# Patient Record
Sex: MALE | Race: WHITE | Employment: OTHER | URBAN - METROPOLITAN AREA
[De-identification: names, ages, dates, MRNs, and addresses within clinical notes are randomized per-mention and may not be internally consistent; named-entity substitution may affect disease eponyms.]

---

## 2017-02-03 ENCOUNTER — OFFICE VISIT (OUTPATIENT)
Dept: INTERNAL MEDICINE | Age: 72
End: 2017-02-03

## 2017-02-03 VITALS
RESPIRATION RATE: 16 BRPM | WEIGHT: 225 LBS | SYSTOLIC BLOOD PRESSURE: 124 MMHG | HEART RATE: 64 BPM | BODY MASS INDEX: 34.1 KG/M2 | TEMPERATURE: 98.2 F | HEIGHT: 68 IN | DIASTOLIC BLOOD PRESSURE: 66 MMHG | OXYGEN SATURATION: 96 %

## 2017-02-03 DIAGNOSIS — G25.0 ESSENTIAL TREMOR: ICD-10-CM

## 2017-02-03 DIAGNOSIS — F17.209 TOBACCO USE DISORDER, CONTINUOUS: ICD-10-CM

## 2017-02-03 DIAGNOSIS — I10 ESSENTIAL HYPERTENSION, BENIGN: Primary | ICD-10-CM

## 2017-02-03 DIAGNOSIS — I25.10 CORONARY ARTERY DISEASE INVOLVING NATIVE CORONARY ARTERY OF NATIVE HEART WITHOUT ANGINA PECTORIS: ICD-10-CM

## 2017-02-03 DIAGNOSIS — E78.5 DYSLIPIDEMIA: ICD-10-CM

## 2017-02-03 DIAGNOSIS — N18.2 CKD (CHRONIC KIDNEY DISEASE) STAGE 2, GFR 60-89 ML/MIN: ICD-10-CM

## 2017-02-03 DIAGNOSIS — Z23 NEED FOR PNEUMOCOCCAL VACCINATION: ICD-10-CM

## 2017-02-03 DIAGNOSIS — J44.9 CHRONIC OBSTRUCTIVE PULMONARY DISEASE, UNSPECIFIED COPD TYPE (HCC): ICD-10-CM

## 2017-02-03 PROCEDURE — G8926 SPIRO NO PERF OR DOC: HCPCS | Performed by: INTERNAL MEDICINE

## 2017-02-03 PROCEDURE — G8419 CALC BMI OUT NRM PARAM NOF/U: HCPCS | Performed by: INTERNAL MEDICINE

## 2017-02-03 PROCEDURE — 3017F COLORECTAL CA SCREEN DOC REV: CPT | Performed by: INTERNAL MEDICINE

## 2017-02-03 PROCEDURE — G8598 ASA/ANTIPLAT THER USED: HCPCS | Performed by: INTERNAL MEDICINE

## 2017-02-03 PROCEDURE — 4004F PT TOBACCO SCREEN RCVD TLK: CPT | Performed by: INTERNAL MEDICINE

## 2017-02-03 PROCEDURE — 1123F ACP DISCUSS/DSCN MKR DOCD: CPT | Performed by: INTERNAL MEDICINE

## 2017-02-03 PROCEDURE — G8427 DOCREV CUR MEDS BY ELIG CLIN: HCPCS | Performed by: INTERNAL MEDICINE

## 2017-02-03 PROCEDURE — G0009 ADMIN PNEUMOCOCCAL VACCINE: HCPCS | Performed by: INTERNAL MEDICINE

## 2017-02-03 PROCEDURE — G8484 FLU IMMUNIZE NO ADMIN: HCPCS | Performed by: INTERNAL MEDICINE

## 2017-02-03 PROCEDURE — 3023F SPIROM DOC REV: CPT | Performed by: INTERNAL MEDICINE

## 2017-02-03 PROCEDURE — 99214 OFFICE O/P EST MOD 30 MIN: CPT | Performed by: INTERNAL MEDICINE

## 2017-02-03 PROCEDURE — 4040F PNEUMOC VAC/ADMIN/RCVD: CPT | Performed by: INTERNAL MEDICINE

## 2017-02-03 PROCEDURE — 90732 PPSV23 VACC 2 YRS+ SUBQ/IM: CPT | Performed by: INTERNAL MEDICINE

## 2017-02-03 ASSESSMENT — ENCOUNTER SYMPTOMS
CONSTIPATION: 0
SHORTNESS OF BREATH: 1
BLOOD IN STOOL: 0
ABDOMINAL PAIN: 0
COUGH: 1
WHEEZING: 0
DIARRHEA: 0

## 2017-02-03 ASSESSMENT — PATIENT HEALTH QUESTIONNAIRE - PHQ9
SUM OF ALL RESPONSES TO PHQ9 QUESTIONS 1 & 2: 0
1. LITTLE INTEREST OR PLEASURE IN DOING THINGS: 0
2. FEELING DOWN, DEPRESSED OR HOPELESS: 0
SUM OF ALL RESPONSES TO PHQ QUESTIONS 1-9: 0

## 2017-02-13 DIAGNOSIS — E11.69 TYPE 2 DIABETES MELLITUS WITH OTHER SPECIFIED COMPLICATION (HCC): ICD-10-CM

## 2017-02-13 LAB
ANION GAP SERPL CALCULATED.3IONS-SCNC: 14 MEQ/L (ref 7–13)
BUN BLDV-MCNC: 17 MG/DL (ref 8–23)
CALCIUM SERPL-MCNC: 9.5 MG/DL (ref 8.6–10.2)
CHLORIDE BLD-SCNC: 94 MEQ/L (ref 98–107)
CO2: 30 MEQ/L (ref 22–29)
CREAT SERPL-MCNC: 0.99 MG/DL (ref 0.7–1.2)
GFR AFRICAN AMERICAN: >60
GFR NON-AFRICAN AMERICAN: >60
GLUCOSE BLD-MCNC: 131 MG/DL (ref 74–109)
HBA1C MFR BLD: 9.7 % (ref 4.8–5.9)
POTASSIUM SERPL-SCNC: 3.7 MEQ/L (ref 3.5–5.1)
SODIUM BLD-SCNC: 138 MEQ/L (ref 132–144)

## 2017-02-23 ENCOUNTER — OFFICE VISIT (OUTPATIENT)
Dept: SURGERY | Age: 72
End: 2017-02-23

## 2017-02-23 VITALS
SYSTOLIC BLOOD PRESSURE: 136 MMHG | HEIGHT: 68 IN | WEIGHT: 223 LBS | BODY MASS INDEX: 33.8 KG/M2 | HEART RATE: 70 BPM | DIASTOLIC BLOOD PRESSURE: 84 MMHG

## 2017-02-23 DIAGNOSIS — E11.69 TYPE 2 DIABETES MELLITUS WITH OTHER SPECIFIED COMPLICATION (HCC): Primary | ICD-10-CM

## 2017-02-23 DIAGNOSIS — E11.21 DIABETIC NEPHROPATHY ASSOCIATED WITH TYPE 2 DIABETES MELLITUS (HCC): ICD-10-CM

## 2017-02-23 LAB — GLUCOSE BLD-MCNC: 181 MG/DL

## 2017-02-23 PROCEDURE — 82962 GLUCOSE BLOOD TEST: CPT | Performed by: INTERNAL MEDICINE

## 2017-02-23 PROCEDURE — 3046F HEMOGLOBIN A1C LEVEL >9.0%: CPT | Performed by: INTERNAL MEDICINE

## 2017-02-23 PROCEDURE — 1123F ACP DISCUSS/DSCN MKR DOCD: CPT | Performed by: INTERNAL MEDICINE

## 2017-02-23 PROCEDURE — 4004F PT TOBACCO SCREEN RCVD TLK: CPT | Performed by: INTERNAL MEDICINE

## 2017-02-23 PROCEDURE — G8598 ASA/ANTIPLAT THER USED: HCPCS | Performed by: INTERNAL MEDICINE

## 2017-02-23 PROCEDURE — G8417 CALC BMI ABV UP PARAM F/U: HCPCS | Performed by: INTERNAL MEDICINE

## 2017-02-23 PROCEDURE — 4040F PNEUMOC VAC/ADMIN/RCVD: CPT | Performed by: INTERNAL MEDICINE

## 2017-02-23 PROCEDURE — 99213 OFFICE O/P EST LOW 20 MIN: CPT | Performed by: INTERNAL MEDICINE

## 2017-02-23 PROCEDURE — G8484 FLU IMMUNIZE NO ADMIN: HCPCS | Performed by: INTERNAL MEDICINE

## 2017-02-23 PROCEDURE — 3017F COLORECTAL CA SCREEN DOC REV: CPT | Performed by: INTERNAL MEDICINE

## 2017-02-23 PROCEDURE — G8427 DOCREV CUR MEDS BY ELIG CLIN: HCPCS | Performed by: INTERNAL MEDICINE

## 2017-02-23 RX ORDER — LISINOPRIL 5 MG/1
5 TABLET ORAL DAILY
Qty: 30 TABLET | Refills: 3 | Status: SHIPPED | OUTPATIENT
Start: 2017-02-23 | End: 2017-05-25 | Stop reason: SDUPTHER

## 2017-02-23 ASSESSMENT — ENCOUNTER SYMPTOMS: SHORTNESS OF BREATH: 1

## 2017-04-24 RX ORDER — GLIMEPIRIDE 2 MG/1
TABLET ORAL
Qty: 60 TABLET | Refills: 3 | Status: SHIPPED | OUTPATIENT
Start: 2017-04-24 | End: 2017-08-24 | Stop reason: SDUPTHER

## 2017-04-24 RX ORDER — SITAGLIPTIN 50 MG/1
TABLET, FILM COATED ORAL
Qty: 30 TABLET | Refills: 3 | Status: SHIPPED | OUTPATIENT
Start: 2017-04-24 | End: 2017-11-27 | Stop reason: SDUPTHER

## 2017-05-19 DIAGNOSIS — E11.69 TYPE 2 DIABETES MELLITUS WITH OTHER SPECIFIED COMPLICATION (HCC): ICD-10-CM

## 2017-05-19 LAB
ANION GAP SERPL CALCULATED.3IONS-SCNC: 14 MEQ/L (ref 7–13)
BUN BLDV-MCNC: 26 MG/DL (ref 8–23)
CALCIUM SERPL-MCNC: 9.3 MG/DL (ref 8.6–10.2)
CHLORIDE BLD-SCNC: 96 MEQ/L (ref 98–107)
CO2: 30 MEQ/L (ref 22–29)
CREAT SERPL-MCNC: 0.93 MG/DL (ref 0.7–1.2)
GFR AFRICAN AMERICAN: >60
GFR NON-AFRICAN AMERICAN: >60
GLUCOSE BLD-MCNC: 170 MG/DL (ref 74–109)
HBA1C MFR BLD: 7.9 % (ref 4.8–5.9)
POTASSIUM SERPL-SCNC: 4.3 MEQ/L (ref 3.5–5.1)
SODIUM BLD-SCNC: 140 MEQ/L (ref 132–144)

## 2017-05-24 ENCOUNTER — OFFICE VISIT (OUTPATIENT)
Dept: INTERNAL MEDICINE | Age: 72
End: 2017-05-24

## 2017-05-24 VITALS
HEART RATE: 69 BPM | RESPIRATION RATE: 16 BRPM | OXYGEN SATURATION: 96 % | TEMPERATURE: 98.9 F | WEIGHT: 230 LBS | BODY MASS INDEX: 34.86 KG/M2 | HEIGHT: 68 IN | DIASTOLIC BLOOD PRESSURE: 68 MMHG | SYSTOLIC BLOOD PRESSURE: 134 MMHG

## 2017-05-24 DIAGNOSIS — I10 ESSENTIAL HYPERTENSION: Primary | ICD-10-CM

## 2017-05-24 DIAGNOSIS — E78.5 DYSLIPIDEMIA: ICD-10-CM

## 2017-05-24 DIAGNOSIS — J44.9 CHRONIC OBSTRUCTIVE PULMONARY DISEASE, UNSPECIFIED COPD TYPE (HCC): ICD-10-CM

## 2017-05-24 PROCEDURE — 3023F SPIROM DOC REV: CPT | Performed by: INTERNAL MEDICINE

## 2017-05-24 PROCEDURE — 1123F ACP DISCUSS/DSCN MKR DOCD: CPT | Performed by: INTERNAL MEDICINE

## 2017-05-24 PROCEDURE — 3017F COLORECTAL CA SCREEN DOC REV: CPT | Performed by: INTERNAL MEDICINE

## 2017-05-24 PROCEDURE — G8427 DOCREV CUR MEDS BY ELIG CLIN: HCPCS | Performed by: INTERNAL MEDICINE

## 2017-05-24 PROCEDURE — 99213 OFFICE O/P EST LOW 20 MIN: CPT | Performed by: INTERNAL MEDICINE

## 2017-05-24 PROCEDURE — 4040F PNEUMOC VAC/ADMIN/RCVD: CPT | Performed by: INTERNAL MEDICINE

## 2017-05-24 PROCEDURE — G8926 SPIRO NO PERF OR DOC: HCPCS | Performed by: INTERNAL MEDICINE

## 2017-05-24 PROCEDURE — G8598 ASA/ANTIPLAT THER USED: HCPCS | Performed by: INTERNAL MEDICINE

## 2017-05-24 PROCEDURE — 4004F PT TOBACCO SCREEN RCVD TLK: CPT | Performed by: INTERNAL MEDICINE

## 2017-05-24 PROCEDURE — G8417 CALC BMI ABV UP PARAM F/U: HCPCS | Performed by: INTERNAL MEDICINE

## 2017-05-24 ASSESSMENT — ENCOUNTER SYMPTOMS
BACK PAIN: 0
NAUSEA: 0
BLOOD IN STOOL: 0
SHORTNESS OF BREATH: 1
VOMITING: 0
CONSTIPATION: 1
WHEEZING: 0
DIARRHEA: 0
COUGH: 0

## 2017-05-25 ENCOUNTER — OFFICE VISIT (OUTPATIENT)
Dept: SURGERY | Age: 72
End: 2017-05-25

## 2017-05-25 VITALS
BODY MASS INDEX: 34.56 KG/M2 | SYSTOLIC BLOOD PRESSURE: 102 MMHG | WEIGHT: 228 LBS | HEIGHT: 68 IN | DIASTOLIC BLOOD PRESSURE: 65 MMHG | HEART RATE: 58 BPM

## 2017-05-25 LAB — GLUCOSE BLD-MCNC: 115 MG/DL

## 2017-05-25 PROCEDURE — 99213 OFFICE O/P EST LOW 20 MIN: CPT | Performed by: INTERNAL MEDICINE

## 2017-05-25 PROCEDURE — G8427 DOCREV CUR MEDS BY ELIG CLIN: HCPCS | Performed by: INTERNAL MEDICINE

## 2017-05-25 PROCEDURE — G8417 CALC BMI ABV UP PARAM F/U: HCPCS | Performed by: INTERNAL MEDICINE

## 2017-05-25 PROCEDURE — G8598 ASA/ANTIPLAT THER USED: HCPCS | Performed by: INTERNAL MEDICINE

## 2017-05-25 PROCEDURE — 3017F COLORECTAL CA SCREEN DOC REV: CPT | Performed by: INTERNAL MEDICINE

## 2017-05-25 PROCEDURE — 4040F PNEUMOC VAC/ADMIN/RCVD: CPT | Performed by: INTERNAL MEDICINE

## 2017-05-25 PROCEDURE — 3045F PR MOST RECENT HEMOGLOBIN A1C LEVEL 7.0-9.0%: CPT | Performed by: INTERNAL MEDICINE

## 2017-05-25 PROCEDURE — 1123F ACP DISCUSS/DSCN MKR DOCD: CPT | Performed by: INTERNAL MEDICINE

## 2017-05-25 PROCEDURE — 82962 GLUCOSE BLOOD TEST: CPT | Performed by: INTERNAL MEDICINE

## 2017-05-25 PROCEDURE — 4004F PT TOBACCO SCREEN RCVD TLK: CPT | Performed by: INTERNAL MEDICINE

## 2017-05-25 RX ORDER — LISINOPRIL 5 MG/1
5 TABLET ORAL DAILY
Qty: 30 TABLET | Refills: 3 | Status: SHIPPED | OUTPATIENT
Start: 2017-05-25 | End: 2017-10-16 | Stop reason: SDUPTHER

## 2017-05-25 ASSESSMENT — ENCOUNTER SYMPTOMS: SHORTNESS OF BREATH: 1

## 2017-08-14 LAB
ANION GAP SERPL CALCULATED.3IONS-SCNC: 16 MEQ/L (ref 7–13)
BUN BLDV-MCNC: 25 MG/DL (ref 8–23)
CALCIUM SERPL-MCNC: 8.9 MG/DL (ref 8.6–10.2)
CHLORIDE BLD-SCNC: 95 MEQ/L (ref 98–107)
CO2: 29 MEQ/L (ref 22–29)
CREAT SERPL-MCNC: 1.02 MG/DL (ref 0.7–1.2)
GFR AFRICAN AMERICAN: >60
GFR NON-AFRICAN AMERICAN: >60
GLUCOSE BLD-MCNC: 147 MG/DL (ref 74–109)
HBA1C MFR BLD: 8.7 % (ref 4.8–5.9)
POTASSIUM SERPL-SCNC: 4.6 MEQ/L (ref 3.5–5.1)
SODIUM BLD-SCNC: 140 MEQ/L (ref 132–144)

## 2017-08-24 ENCOUNTER — OFFICE VISIT (OUTPATIENT)
Dept: SURGERY | Age: 72
End: 2017-08-24

## 2017-08-24 VITALS
HEART RATE: 64 BPM | HEIGHT: 68 IN | DIASTOLIC BLOOD PRESSURE: 65 MMHG | WEIGHT: 227 LBS | BODY MASS INDEX: 34.4 KG/M2 | SYSTOLIC BLOOD PRESSURE: 115 MMHG

## 2017-08-24 DIAGNOSIS — E11.69 TYPE 2 DIABETES MELLITUS WITH OTHER SPECIFIED COMPLICATION (HCC): Primary | ICD-10-CM

## 2017-08-24 LAB — GLUCOSE BLD-MCNC: 175 MG/DL

## 2017-08-24 PROCEDURE — 4040F PNEUMOC VAC/ADMIN/RCVD: CPT | Performed by: INTERNAL MEDICINE

## 2017-08-24 PROCEDURE — G8598 ASA/ANTIPLAT THER USED: HCPCS | Performed by: INTERNAL MEDICINE

## 2017-08-24 PROCEDURE — 99213 OFFICE O/P EST LOW 20 MIN: CPT | Performed by: INTERNAL MEDICINE

## 2017-08-24 PROCEDURE — 4004F PT TOBACCO SCREEN RCVD TLK: CPT | Performed by: INTERNAL MEDICINE

## 2017-08-24 PROCEDURE — G8427 DOCREV CUR MEDS BY ELIG CLIN: HCPCS | Performed by: INTERNAL MEDICINE

## 2017-08-24 PROCEDURE — 3046F HEMOGLOBIN A1C LEVEL >9.0%: CPT | Performed by: INTERNAL MEDICINE

## 2017-08-24 PROCEDURE — 1123F ACP DISCUSS/DSCN MKR DOCD: CPT | Performed by: INTERNAL MEDICINE

## 2017-08-24 PROCEDURE — 3017F COLORECTAL CA SCREEN DOC REV: CPT | Performed by: INTERNAL MEDICINE

## 2017-08-24 PROCEDURE — G8417 CALC BMI ABV UP PARAM F/U: HCPCS | Performed by: INTERNAL MEDICINE

## 2017-08-24 PROCEDURE — 82962 GLUCOSE BLOOD TEST: CPT | Performed by: INTERNAL MEDICINE

## 2017-08-24 RX ORDER — GLIMEPIRIDE 2 MG/1
TABLET ORAL
Qty: 60 TABLET | Refills: 3 | Status: SHIPPED | OUTPATIENT
Start: 2017-08-24 | End: 2017-10-03 | Stop reason: SDUPTHER

## 2017-08-24 ASSESSMENT — ENCOUNTER SYMPTOMS: SHORTNESS OF BREATH: 1

## 2017-08-28 ENCOUNTER — CARE COORDINATION (OUTPATIENT)
Dept: CARE COORDINATION | Age: 72
End: 2017-08-28

## 2017-09-22 ENCOUNTER — OFFICE VISIT (OUTPATIENT)
Dept: UROLOGY | Age: 72
End: 2017-09-22

## 2017-09-22 VITALS
WEIGHT: 227 LBS | HEIGHT: 68 IN | HEART RATE: 66 BPM | BODY MASS INDEX: 34.4 KG/M2 | DIASTOLIC BLOOD PRESSURE: 70 MMHG | SYSTOLIC BLOOD PRESSURE: 120 MMHG

## 2017-09-22 DIAGNOSIS — N40.0 BENIGN NON-NODULAR PROSTATIC HYPERPLASIA WITHOUT LOWER URINARY TRACT SYMPTOMS: Primary | ICD-10-CM

## 2017-09-22 PROCEDURE — 1123F ACP DISCUSS/DSCN MKR DOCD: CPT | Performed by: UROLOGY

## 2017-09-22 PROCEDURE — G8427 DOCREV CUR MEDS BY ELIG CLIN: HCPCS | Performed by: UROLOGY

## 2017-09-22 PROCEDURE — 4004F PT TOBACCO SCREEN RCVD TLK: CPT | Performed by: UROLOGY

## 2017-09-22 PROCEDURE — 4040F PNEUMOC VAC/ADMIN/RCVD: CPT | Performed by: UROLOGY

## 2017-09-22 PROCEDURE — G8598 ASA/ANTIPLAT THER USED: HCPCS | Performed by: UROLOGY

## 2017-09-22 PROCEDURE — 3017F COLORECTAL CA SCREEN DOC REV: CPT | Performed by: UROLOGY

## 2017-09-22 PROCEDURE — G8417 CALC BMI ABV UP PARAM F/U: HCPCS | Performed by: UROLOGY

## 2017-09-22 PROCEDURE — 99213 OFFICE O/P EST LOW 20 MIN: CPT | Performed by: UROLOGY

## 2017-10-03 ENCOUNTER — OFFICE VISIT (OUTPATIENT)
Dept: FAMILY MEDICINE CLINIC | Age: 72
End: 2017-10-03

## 2017-10-03 VITALS
DIASTOLIC BLOOD PRESSURE: 52 MMHG | HEIGHT: 68 IN | BODY MASS INDEX: 34.56 KG/M2 | OXYGEN SATURATION: 97 % | HEART RATE: 72 BPM | SYSTOLIC BLOOD PRESSURE: 108 MMHG | WEIGHT: 228 LBS

## 2017-10-03 DIAGNOSIS — D47.9 LYMPHOPROLIFERATIVE DISORDER (HCC): ICD-10-CM

## 2017-10-03 DIAGNOSIS — E11.9 TYPE 2 DIABETES MELLITUS WITHOUT COMPLICATION, WITHOUT LONG-TERM CURRENT USE OF INSULIN (HCC): ICD-10-CM

## 2017-10-03 DIAGNOSIS — E66.9 OBESITY (BMI 30.0-34.9): ICD-10-CM

## 2017-10-03 DIAGNOSIS — I50.9 CONGESTIVE HEART FAILURE, UNSPECIFIED CONGESTIVE HEART FAILURE CHRONICITY, UNSPECIFIED CONGESTIVE HEART FAILURE TYPE: ICD-10-CM

## 2017-10-03 DIAGNOSIS — I87.2 VENOUS STASIS DERMATITIS OF BOTH LOWER EXTREMITIES: ICD-10-CM

## 2017-10-03 DIAGNOSIS — E78.2 MIXED HYPERLIPIDEMIA: ICD-10-CM

## 2017-10-03 DIAGNOSIS — Z72.0 TOBACCO ABUSE: ICD-10-CM

## 2017-10-03 DIAGNOSIS — I10 ESSENTIAL HYPERTENSION: Primary | ICD-10-CM

## 2017-10-03 PROCEDURE — 4004F PT TOBACCO SCREEN RCVD TLK: CPT | Performed by: FAMILY MEDICINE

## 2017-10-03 PROCEDURE — 3017F COLORECTAL CA SCREEN DOC REV: CPT | Performed by: FAMILY MEDICINE

## 2017-10-03 PROCEDURE — 4040F PNEUMOC VAC/ADMIN/RCVD: CPT | Performed by: FAMILY MEDICINE

## 2017-10-03 PROCEDURE — G8427 DOCREV CUR MEDS BY ELIG CLIN: HCPCS | Performed by: FAMILY MEDICINE

## 2017-10-03 PROCEDURE — 99214 OFFICE O/P EST MOD 30 MIN: CPT | Performed by: FAMILY MEDICINE

## 2017-10-03 PROCEDURE — G8598 ASA/ANTIPLAT THER USED: HCPCS | Performed by: FAMILY MEDICINE

## 2017-10-03 PROCEDURE — 3046F HEMOGLOBIN A1C LEVEL >9.0%: CPT | Performed by: FAMILY MEDICINE

## 2017-10-03 PROCEDURE — G8484 FLU IMMUNIZE NO ADMIN: HCPCS | Performed by: FAMILY MEDICINE

## 2017-10-03 PROCEDURE — 1123F ACP DISCUSS/DSCN MKR DOCD: CPT | Performed by: FAMILY MEDICINE

## 2017-10-03 PROCEDURE — G8417 CALC BMI ABV UP PARAM F/U: HCPCS | Performed by: FAMILY MEDICINE

## 2017-10-03 RX ORDER — GLIMEPIRIDE 2 MG/1
TABLET ORAL
Qty: 60 TABLET | Refills: 5 | Status: SHIPPED | OUTPATIENT
Start: 2017-10-03 | End: 2018-04-03 | Stop reason: SDUPTHER

## 2017-10-03 ASSESSMENT — ENCOUNTER SYMPTOMS: SHORTNESS OF BREATH: 0

## 2017-10-03 NOTE — MR AVS SNAPSHOT
medical emergencies, dial 911. For questions regarding your [employer] account call [phone]. If you have a clinical question, please call your doctor's office.

## 2017-10-03 NOTE — PROGRESS NOTES
Subjective  Ha Kebedety, 74 y.o. male presents today with:  Chief Complaint   Patient presents with    3 Month Follow-Up     Pt of Dr Meghann Lomas but he is out on medical leave       Hypertension   This is a chronic problem. The current episode started more than 1 year ago. The problem has been rapidly improving since onset. The problem is controlled. Pertinent negatives include no chest pain, headaches, palpitations or shortness of breath. There are no associated agents to hypertension. Risk factors for coronary artery disease include dyslipidemia, male gender and obesity. Past treatments include diuretics, ACE inhibitors and beta blockers. The current treatment provides significant improvement. There is no history of kidney disease. Here to see me because Dr. Meghann Lomas is out on medical leave. Was started on Insulin Dec 2016 by Dr. Manolo Myrick who manages his diabetes. Review of Systems   Respiratory: Negative for shortness of breath. Cardiovascular: Negative for chest pain and palpitations. Neurological: Negative for headaches. Past Medical History:   Diagnosis Date    BPH (benign prostatic hyperplasia)     Dr. Rubi Mcmillan CAD (coronary artery disease)     status post CABG,    [facility] failure[facility])     Chronic venous insufficiency     COPD (chronic obstructive pulmonary disease) ([facility])     Edema of extremities 9/30/2013    Elevated PSA     Enlarged prostate     Hyperlipidemia     Hypertension     Hypoxemia     uses supplemental oxygen at night.  Leukocytosis 10/11/2012    Lymphoproliferative disorder ([address] 1/9/2014    Macular degeneration, right eye     Obesity (BMI 30.0-34. 9)     Osteoarthritis of both knees     Pneumonia     Stasis dermatitis     uses lachyrdrin    Tobacco abuse     Type II diabetes mellitus, uncontrolled ([address]     Varicose veins     status post stripping procedure.       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 Also worked for [employer]. Social History Main Topics    Smoking status: Current Every Day Smoker     Packs/day: 0.50     Years: 46.00     Types: Cigarettes    Smokeless tobacco: Never Used      Comment: pt aware of danger    Alcohol use Yes      Comment: drinks an occasional beer every 3 months.  Drug use: No    Sexual activity: Yes     Partners: Female     Other Topics Concern    Not on file     Social History Narrative     No family history on file. No Known Allergies  Current Outpatient Prescriptions   Medication Sig Dispense Refill    glimepiride (AMARYL) 2 MG tablet TAKE 1 TABLET BY MOUTH TWICE DAILY 60 tablet 5    insulin glargine (LANTUS SOLOSTAR) 100 UNIT/ML injection pen 35 units at bedtime 10 Pen 3    lisinopril (PRINIVIL;ZESTRIL) 5 MG tablet Take 1 tablet by mouth daily 30 tablet 3    JANUVIA 50 MG tablet Take 1 tablet by mouth daily 30 tablet 3    Insulin Pen Needle (NOVOFINE) 32G X 6 MM MISC qd 100 each 3    ammonium lactate (LAC-HYDRIN) 12 % lotion Apply topically daily Apply topically as needed.  furosemide (LASIX) 40 MG tablet Take 40 mg by mouth See Admin Instructions 1 tab 2-3 times weekly, may increase to 1 tab daily   Calixto      Magnesium 500 MG TABS Take  by mouth.  budesonide (PULMICORT) 0.5 MG/2ML nebulizer suspension Take 1 ampule by nebulization 2 times daily. Panch       aspirin 325 MG tablet Take 325 mg by mouth daily.  hydrochlorothiazide (HYDRODIURIL) 25 MG tablet Take 25 mg by mouth daily. [address] [facility]      metoprolol (LOPRESSOR) 100 MG tablet Take 50 mg by mouth 2 times daily.  1/2 tab twice daily      

## 2017-10-16 RX ORDER — LISINOPRIL 5 MG/1
5 TABLET ORAL DAILY
Qty: 30 TABLET | Refills: 3 | Status: SHIPPED | OUTPATIENT
Start: 2017-10-16 | End: 2018-02-18 | Stop reason: SDUPTHER

## 2017-10-19 ENCOUNTER — OFFICE VISIT (OUTPATIENT)
Dept: PULMONOLOGY | Age: 72
End: 2017-10-19

## 2017-10-19 VITALS
SYSTOLIC BLOOD PRESSURE: 124 MMHG | HEART RATE: 62 BPM | TEMPERATURE: 97.3 F | BODY MASS INDEX: 33.62 KG/M2 | HEIGHT: 69 IN | WEIGHT: 227 LBS | OXYGEN SATURATION: 93 % | DIASTOLIC BLOOD PRESSURE: 72 MMHG

## 2017-10-19 DIAGNOSIS — Z72.0 TOBACCO ABUSE: ICD-10-CM

## 2017-10-19 DIAGNOSIS — J44.9 ASTHMA-COPD OVERLAP SYNDROME (HCC): Primary | ICD-10-CM

## 2017-10-19 DIAGNOSIS — E66.9 OBESITY (BMI 30.0-34.9): ICD-10-CM

## 2017-10-19 PROCEDURE — G8484 FLU IMMUNIZE NO ADMIN: HCPCS | Performed by: INTERNAL MEDICINE

## 2017-10-19 PROCEDURE — 4004F PT TOBACCO SCREEN RCVD TLK: CPT | Performed by: INTERNAL MEDICINE

## 2017-10-19 PROCEDURE — G8417 CALC BMI ABV UP PARAM F/U: HCPCS | Performed by: INTERNAL MEDICINE

## 2017-10-19 PROCEDURE — 4040F PNEUMOC VAC/ADMIN/RCVD: CPT | Performed by: INTERNAL MEDICINE

## 2017-10-19 PROCEDURE — 3023F SPIROM DOC REV: CPT | Performed by: INTERNAL MEDICINE

## 2017-10-19 PROCEDURE — G8598 ASA/ANTIPLAT THER USED: HCPCS | Performed by: INTERNAL MEDICINE

## 2017-10-19 PROCEDURE — G8926 SPIRO NO PERF OR DOC: HCPCS | Performed by: INTERNAL MEDICINE

## 2017-10-19 PROCEDURE — 99214 OFFICE O/P EST MOD 30 MIN: CPT | Performed by: INTERNAL MEDICINE

## 2017-10-19 PROCEDURE — G8427 DOCREV CUR MEDS BY ELIG CLIN: HCPCS | Performed by: INTERNAL MEDICINE

## 2017-10-19 PROCEDURE — 3017F COLORECTAL CA SCREEN DOC REV: CPT | Performed by: INTERNAL MEDICINE

## 2017-10-19 PROCEDURE — 1123F ACP DISCUSS/DSCN MKR DOCD: CPT | Performed by: INTERNAL MEDICINE

## 2017-10-19 RX ORDER — BUDESONIDE 0.5 MG/2ML
1 INHALANT ORAL 2 TIMES DAILY
Qty: 60 AMPULE | Refills: 11 | Status: SHIPPED | OUTPATIENT
Start: 2017-10-19 | End: 2017-10-24 | Stop reason: SDUPTHER

## 2017-10-19 RX ORDER — IPRATROPIUM BROMIDE AND ALBUTEROL SULFATE 2.5; .5 MG/3ML; MG/3ML
1 SOLUTION RESPIRATORY (INHALATION) EVERY 6 HOURS
Qty: 120 ML | Refills: 11 | Status: SHIPPED | OUTPATIENT
Start: 2017-10-19 | End: 2018-02-23 | Stop reason: SDUPTHER

## 2017-10-19 ASSESSMENT — ENCOUNTER SYMPTOMS: SHORTNESS OF BREATH: 1

## 2017-10-19 NOTE — PROGRESS NOTES
Packs/day: 0.30     Years: 46.00     Types: Cigarettes    Smokeless tobacco: Never Used      Comment: pt aware of danger    Alcohol use Yes      Comment: drinks an occasional beer every 3 months.  Drug use: No    Sexual activity: Yes     Partners: Female     Other Topics Concern    Not on file     Social History Narrative    No narrative on file         Review of Systems   Respiratory: Positive for shortness of breath. Objective:     Vitals:    10/19/17 0906   BP: 124/72   Site: Right Arm   Position: Sitting   Cuff Size: Large Adult   Pulse: 62   Temp: 97.3 °F (36.3 °C)   TempSrc: Temporal   SpO2: 93%   Weight: 227 lb (103 kg)   Height: 5' 9\" (1.753 m)         Physical Exam   Constitutional: He is oriented to person, place, and time. He appears well-developed and well-nourished. obesity   HENT:   Head: Normocephalic and atraumatic. Nose: Nose normal.   Mouth/Throat: Oropharynx is clear and moist.   Eyes: Conjunctivae and EOM are normal. Pupils are equal, round, and reactive to light. Neck: No JVD present. No tracheal deviation present. No thyromegaly present. Cardiovascular: Normal rate and regular rhythm. Exam reveals no gallop and no friction rub. No murmur heard. Pulmonary/Chest: Effort normal. No respiratory distress. He has wheezes. He has no rales. He exhibits no tenderness. Diminished BS both lung   Abdominal: He exhibits no distension. Musculoskeletal: Normal range of motion. Lymphadenopathy:     He has no cervical adenopathy. Neurological: He is alert and oriented to person, place, and time. No cranial nerve deficit. Skin: Skin is warm and dry. No rash noted. Chronic discoloration of skin. Psychiatric: He has a normal mood and affect.  His behavior is normal.       Current Outpatient Prescriptions   Medication Sig Dispense Refill    lisinopril (PRINIVIL;ZESTRIL) 5 MG tablet Take 1 tablet by mouth daily 30 tablet 3    glimepiride (AMARYL) 2 MG tablet TAKE 1 TABLET BY MOUTH TWICE DAILY 60 tablet 5    insulin glargine (LANTUS SOLOSTAR) 100 UNIT/ML injection pen 35 units at bedtime 10 Pen 3    JANUVIA 50 MG tablet Take 1 tablet by mouth daily 30 tablet 3    Insulin Pen Needle (NOVOFINE) 32G X 6 MM MISC qd 100 each 3    ammonium lactate (LAC-HYDRIN) 12 % lotion Apply topically daily Apply topically as needed.  furosemide (LASIX) 40 MG tablet Take 40 mg by mouth See Admin Instructions 1 tab 2-3 times weekly, may increase to 1 tab daily   Calixto      Magnesium 500 MG TABS Take  by mouth.  budesonide (PULMICORT) 0.5 MG/2ML nebulizer suspension Take 1 ampule by nebulization 2 times daily. Panch       aspirin 325 MG tablet Take 325 mg by mouth daily.  hydrochlorothiazide (HYDRODIURIL) 25 MG tablet Take 25 mg by mouth daily. [facility]      metoprolol (LOPRESSOR) 100 MG tablet Take 50 mg by mouth 2 times daily. 1/2 tab twice daily       simvastatin (ZOCOR) 80 MG tablet Take 80 mg by mouth nightly. [address] [facility] Inhale  into the lungs. As directed by Dr Heather Brumfield. No current facility-administered medications for this visit. Assessment/Plan:     1. Asthma-COPD overlap syndrome ([facility]  Patient is having exertional shortness of breath, no significant change. Continue bronchodilator with nebulizer DuoNeb 4 times a day and Pulmicort twice a day as before. No chest x-ray PFT done for long time so request  a chest x-ray and PFT before next visit  Nebulizer solution refilled send to [facility]. - FULL PFT STUDY WITH PRE AND POST; Future  - XR CHEST STANDARD (2 VW); Future    2. Tobacco abuse  Patient advised try to quit smoking. Risks related to smoking explained to the patient. 3. Obesity (BMI 30.0-34. 9)  Patient patient is advised try to lose weight. obesity related risk explained to the patient ,  Current weight:  227 lb (103 kg) Lbs. BMI:  Body mass index is 33.52 kg/m².         Return in about 4 months (around 2/19/2018) for

## 2017-10-24 DIAGNOSIS — J44.9 ASTHMA-COPD OVERLAP SYNDROME (HCC): ICD-10-CM

## 2017-10-25 RX ORDER — IPRATROPIUM BROMIDE AND ALBUTEROL SULFATE 2.5; .5 MG/3ML; MG/3ML
1 SOLUTION RESPIRATORY (INHALATION) EVERY 4 HOURS
Qty: 360 ML | Refills: 2 | Status: SHIPPED | OUTPATIENT
Start: 2017-10-25 | End: 2017-11-27 | Stop reason: SDUPTHER

## 2017-10-25 RX ORDER — BUDESONIDE 0.5 MG/2ML
1 INHALANT ORAL 2 TIMES DAILY
Qty: 60 AMPULE | Refills: 11 | Status: SHIPPED | OUTPATIENT
Start: 2017-10-25 | End: 2018-02-23 | Stop reason: SDUPTHER

## 2017-11-01 ENCOUNTER — HOSPITAL ENCOUNTER (OUTPATIENT)
Dept: GENERAL RADIOLOGY | Age: 72
Discharge: HOME | End: 2017-11-01
Payer: MEDICARE

## 2017-11-01 ENCOUNTER — HOSPITAL ENCOUNTER (OUTPATIENT)
Dept: PULMONOLOGY | Age: 72
Discharge: HOME | End: 2017-11-01
Payer: MEDICARE

## 2017-11-01 DIAGNOSIS — J44.9 ASTHMA-COPD OVERLAP SYNDROME (HCC): ICD-10-CM

## 2017-11-01 DIAGNOSIS — Z72.0 TOBACCO ABUSE: ICD-10-CM

## 2017-11-01 PROCEDURE — 94060 EVALUATION OF WHEEZING: CPT

## 2017-11-01 PROCEDURE — 71020 XR CHEST STANDARD TWO VW: CPT

## 2017-11-01 PROCEDURE — 6360000002 HC RX W HCPCS: Performed by: INTERNAL MEDICINE

## 2017-11-01 PROCEDURE — 94729 DIFFUSING CAPACITY: CPT

## 2017-11-01 PROCEDURE — 94726 PLETHYSMOGRAPHY LUNG VOLUMES: CPT

## 2017-11-01 RX ORDER — ALBUTEROL SULFATE 2.5 MG/3ML
2.5 SOLUTION RESPIRATORY (INHALATION) ONCE
Status: COMPLETED | OUTPATIENT
Start: 2017-11-01 | End: 2017-11-01

## 2017-11-01 RX ADMIN — ALBUTEROL SULFATE 2.5 MG: 2.5 SOLUTION RESPIRATORY (INHALATION) at 12:24

## 2017-11-02 PROCEDURE — 94726 PLETHYSMOGRAPHY LUNG VOLUMES: CPT | Performed by: INTERNAL MEDICINE

## 2017-11-02 PROCEDURE — 94729 DIFFUSING CAPACITY: CPT | Performed by: INTERNAL MEDICINE

## 2017-11-02 PROCEDURE — 94060 EVALUATION OF WHEEZING: CPT | Performed by: INTERNAL MEDICINE

## 2017-11-03 DIAGNOSIS — E78.2 MIXED HYPERLIPIDEMIA: ICD-10-CM

## 2017-11-03 DIAGNOSIS — I10 ESSENTIAL HYPERTENSION: ICD-10-CM

## 2017-11-03 DIAGNOSIS — E11.69 TYPE 2 DIABETES MELLITUS WITH OTHER SPECIFIED COMPLICATION (HCC): ICD-10-CM

## 2017-11-03 DIAGNOSIS — D47.9 LYMPHOPROLIFERATIVE DISORDER (HCC): ICD-10-CM

## 2017-11-03 LAB
ALT SERPL-CCNC: 12 U/L (ref 0–41)
ANION GAP SERPL CALCULATED.3IONS-SCNC: 15 MEQ/L (ref 7–13)
AST SERPL-CCNC: 15 U/L (ref 0–40)
BUN BLDV-MCNC: 22 MG/DL (ref 8–23)
CALCIUM SERPL-MCNC: 9.1 MG/DL (ref 8.6–10.2)
CHLORIDE BLD-SCNC: 96 MEQ/L (ref 98–107)
CHOLESTEROL, TOTAL: 134 MG/DL (ref 0–199)
CO2: 29 MEQ/L (ref 22–29)
CREAT SERPL-MCNC: 0.95 MG/DL (ref 0.7–1.2)
GFR AFRICAN AMERICAN: >60
GFR NON-AFRICAN AMERICAN: >60
GLUCOSE BLD-MCNC: 103 MG/DL (ref 74–109)
HBA1C MFR BLD: 8.2 % (ref 4.8–5.9)
HCT VFR BLD CALC: 49.4 % (ref 42–52)
HDLC SERPL-MCNC: 37 MG/DL (ref 40–59)
HEMOGLOBIN: 16.2 G/DL (ref 14–18)
LDL CHOLESTEROL CALCULATED: 73 MG/DL (ref 0–129)
MCH RBC QN AUTO: 28 PG (ref 27–31.3)
MCHC RBC AUTO-ENTMCNC: 32.9 % (ref 33–37)
MCV RBC AUTO: 85.1 FL (ref 80–100)
PDW BLD-RTO: 14.8 % (ref 11.5–14.5)
PLATELET # BLD: 116 K/UL (ref 130–400)
POTASSIUM SERPL-SCNC: 3.8 MEQ/L (ref 3.5–5.1)
RBC # BLD: 5.8 M/UL (ref 4.7–6.1)
SODIUM BLD-SCNC: 140 MEQ/L (ref 132–144)
TRIGL SERPL-MCNC: 121 MG/DL (ref 0–200)
WBC # BLD: 12.7 K/UL (ref 4.8–10.8)

## 2017-11-03 NOTE — PROCEDURES
[address]                     [address], [address] [facility]                              PULMONARY FUNCTION    PATIENT NAME: Azra Ramírez                :             1945  MED REC NO:   [id number]                           ROOM:  ACCOUNT NO:   [de-identified]                          ADMISSION DATE:  2017  PROVIDER:     Candi Mckeon MD    DATE OF PROCEDURE:  2017    PFTs were done on this 75-year-old gentleman who is 5 feet 9 inches,  weighs 225 pounds with 50-year smoking history, presenting with  dyspnea. Spirometry showed a forced vital capacity of 2.8 L which is 68% of  predicted. FEV1 was 1.58 L which is 53% of predicted. FEV1/FVC ratio  was significantly reduced to 56%. FEF 25-75% was severely reduced to  0.65 L per second which is 30% of predicted. Both FEV1 and terminal  airflow improved significantly after bronchodilators. MVV was  severely reduced. Lung volumes done by body plethysmography showed a total lung capacity  to 6.16 L which is 90% of predicted. Residual volume was 3.48 L which  is 139% of predicted. RV/TLC ratio was increased to 56%. Diffusion capacity was mildly reduced to 14.06 which is 73% of  predicted. Airway resistance was increased. Specific airway conductance was  reduced. OVERALL IMPRESSION:  This study shows evidence of moderate-to-severe  obstructive ventilatory impairment associated with mild overinflation  and mild diffusion abnormality consistent with chronic obstructive  pulmonary disease. Clinical correlation is needed.       Mike Hanson MD    D: 2017 9:58:52       T: 2017 20:17:54     [email or web address]  Job#: [id number]     Doc#: [id number]

## 2017-11-27 ENCOUNTER — OFFICE VISIT (OUTPATIENT)
Dept: SURGERY | Age: 72
End: 2017-11-27

## 2017-11-27 VITALS
SYSTOLIC BLOOD PRESSURE: 131 MMHG | WEIGHT: 229 LBS | DIASTOLIC BLOOD PRESSURE: 74 MMHG | BODY MASS INDEX: 34.71 KG/M2 | HEIGHT: 68 IN | HEART RATE: 59 BPM

## 2017-11-27 LAB — GLUCOSE BLD-MCNC: 145 MG/DL

## 2017-11-27 PROCEDURE — 3017F COLORECTAL CA SCREEN DOC REV: CPT | Performed by: INTERNAL MEDICINE

## 2017-11-27 PROCEDURE — G8598 ASA/ANTIPLAT THER USED: HCPCS | Performed by: INTERNAL MEDICINE

## 2017-11-27 PROCEDURE — 1123F ACP DISCUSS/DSCN MKR DOCD: CPT | Performed by: INTERNAL MEDICINE

## 2017-11-27 PROCEDURE — 3045F PR MOST RECENT HEMOGLOBIN A1C LEVEL 7.0-9.0%: CPT | Performed by: INTERNAL MEDICINE

## 2017-11-27 PROCEDURE — 4040F PNEUMOC VAC/ADMIN/RCVD: CPT | Performed by: INTERNAL MEDICINE

## 2017-11-27 PROCEDURE — 82962 GLUCOSE BLOOD TEST: CPT | Performed by: INTERNAL MEDICINE

## 2017-11-27 PROCEDURE — G8427 DOCREV CUR MEDS BY ELIG CLIN: HCPCS | Performed by: INTERNAL MEDICINE

## 2017-11-27 PROCEDURE — 99213 OFFICE O/P EST LOW 20 MIN: CPT | Performed by: INTERNAL MEDICINE

## 2017-11-27 PROCEDURE — G8417 CALC BMI ABV UP PARAM F/U: HCPCS | Performed by: INTERNAL MEDICINE

## 2017-11-27 PROCEDURE — 4004F PT TOBACCO SCREEN RCVD TLK: CPT | Performed by: INTERNAL MEDICINE

## 2017-11-27 PROCEDURE — G8484 FLU IMMUNIZE NO ADMIN: HCPCS | Performed by: INTERNAL MEDICINE

## 2018-02-19 LAB
ANION GAP SERPL CALCULATED.3IONS-SCNC: 17 MEQ/L (ref 7–13)
BUN BLDV-MCNC: 21 MG/DL (ref 8–23)
CALCIUM SERPL-MCNC: 9.1 MG/DL (ref 8.6–10.2)
CHLORIDE BLD-SCNC: 99 MEQ/L (ref 98–107)
CO2: 26 MEQ/L (ref 22–29)
CREAT SERPL-MCNC: 1 MG/DL (ref 0.7–1.2)
GFR AFRICAN AMERICAN: >60
GFR NON-AFRICAN AMERICAN: >60
GLUCOSE BLD-MCNC: 108 MG/DL (ref 74–109)
HBA1C MFR BLD: 8.4 % (ref 4.8–5.9)
POTASSIUM SERPL-SCNC: 3.8 MEQ/L (ref 3.5–5.1)
SODIUM BLD-SCNC: 142 MEQ/L (ref 132–144)

## 2018-02-19 RX ORDER — LISINOPRIL 5 MG/1
5 TABLET ORAL DAILY
Qty: 30 TABLET | Refills: 3 | Status: SHIPPED | OUTPATIENT
Start: 2018-02-19 | End: 2018-06-08 | Stop reason: SDUPTHER

## 2018-02-23 ENCOUNTER — OFFICE VISIT (OUTPATIENT)
Dept: PULMONOLOGY | Age: 73
End: 2018-02-23
Payer: MEDICARE

## 2018-02-23 VITALS
SYSTOLIC BLOOD PRESSURE: 138 MMHG | OXYGEN SATURATION: 97 % | BODY MASS INDEX: 34.86 KG/M2 | WEIGHT: 230 LBS | DIASTOLIC BLOOD PRESSURE: 80 MMHG | HEIGHT: 68 IN | TEMPERATURE: 96.8 F | HEART RATE: 72 BPM

## 2018-02-23 DIAGNOSIS — J44.9 ASTHMA-COPD OVERLAP SYNDROME (HCC): Primary | ICD-10-CM

## 2018-02-23 DIAGNOSIS — E66.9 OBESITY (BMI 30.0-34.9): ICD-10-CM

## 2018-02-23 DIAGNOSIS — I50.9 CONGESTIVE HEART FAILURE, UNSPECIFIED CONGESTIVE HEART FAILURE CHRONICITY, UNSPECIFIED CONGESTIVE HEART FAILURE TYPE: ICD-10-CM

## 2018-02-23 DIAGNOSIS — Z72.0 TOBACCO ABUSE: ICD-10-CM

## 2018-02-23 PROCEDURE — 1123F ACP DISCUSS/DSCN MKR DOCD: CPT | Performed by: INTERNAL MEDICINE

## 2018-02-23 PROCEDURE — 3017F COLORECTAL CA SCREEN DOC REV: CPT | Performed by: INTERNAL MEDICINE

## 2018-02-23 PROCEDURE — 4040F PNEUMOC VAC/ADMIN/RCVD: CPT | Performed by: INTERNAL MEDICINE

## 2018-02-23 PROCEDURE — G8427 DOCREV CUR MEDS BY ELIG CLIN: HCPCS | Performed by: INTERNAL MEDICINE

## 2018-02-23 PROCEDURE — 3023F SPIROM DOC REV: CPT | Performed by: INTERNAL MEDICINE

## 2018-02-23 PROCEDURE — G8417 CALC BMI ABV UP PARAM F/U: HCPCS | Performed by: INTERNAL MEDICINE

## 2018-02-23 PROCEDURE — G8598 ASA/ANTIPLAT THER USED: HCPCS | Performed by: INTERNAL MEDICINE

## 2018-02-23 PROCEDURE — 4004F PT TOBACCO SCREEN RCVD TLK: CPT | Performed by: INTERNAL MEDICINE

## 2018-02-23 PROCEDURE — G8926 SPIRO NO PERF OR DOC: HCPCS | Performed by: INTERNAL MEDICINE

## 2018-02-23 PROCEDURE — 99214 OFFICE O/P EST MOD 30 MIN: CPT | Performed by: INTERNAL MEDICINE

## 2018-02-23 PROCEDURE — G8484 FLU IMMUNIZE NO ADMIN: HCPCS | Performed by: INTERNAL MEDICINE

## 2018-02-23 RX ORDER — BUDESONIDE 0.5 MG/2ML
1 INHALANT ORAL 2 TIMES DAILY
Qty: 60 AMPULE | Refills: 11 | Status: SHIPPED | OUTPATIENT
Start: 2018-02-23

## 2018-02-23 RX ORDER — IPRATROPIUM BROMIDE AND ALBUTEROL SULFATE 2.5; .5 MG/3ML; MG/3ML
1 SOLUTION RESPIRATORY (INHALATION) EVERY 6 HOURS
Qty: 120 ML | Refills: 11 | Status: SHIPPED | OUTPATIENT
Start: 2018-02-23

## 2018-02-23 ASSESSMENT — ENCOUNTER SYMPTOMS
CHEST TIGHTNESS: 0
RHINORRHEA: 0
VOMITING: 0
DIARRHEA: 0
SORE THROAT: 0
EYE ITCHING: 0
WHEEZING: 0
COUGH: 0
ABDOMINAL PAIN: 0
NAUSEA: 0
VOICE CHANGE: 0
SHORTNESS OF BREATH: 1

## 2018-02-23 NOTE — PROGRESS NOTES
Subjective:     Bev Raymundo is a 74 y.o. male who complains today of:     Chief Complaint   Patient presents with    COPD     need refills on nebulizer solution to [facility]       HPI  CXR and PFT done on 11/1/17  Patient is using duoneb and pulmicort  C/o shortness of breath with exertion. No  Wheezing   No Cough or  Sputum  No Hemoptysis  No Chest tightness   No Chest pain with radiation  or pleuritic pain  No Fever or chills. C/o  Rhinorrhea and postnasal drip in morning      Allergies:  Review of patient's allergies indicates no known allergies. Past Medical History:   Diagnosis Date    BPH (benign prostatic hyperplasia)     Dr. Meera Caldwell CAD (coronary artery disease)     status post CABG,    CH[facility]congest[facility] failure) [facility])     Chronic venous insufficiency     COPD (chronic obstructive pulmonary disease) ([facility])     Edema of extremities 9/30/2013    Elevated PSA     Enlarged prostate     Hyperlipidemia     Hypertension     Hypoxemia     uses supplemental oxygen at night.  Lymphoproliferative disorder ([address] 2013    had previous full work up. Dr. Nancy Rinaldi. High WBC and low PLT    Macular degeneration, right eye     Obesity (BMI 30.0-34. 9)     Osteoarthritis of both knees     Pneumonia     Stasis dermatitis     uses lachyrdrin    Tobacco abuse     Type II diabetes mellitus, uncontrolled ([address]     Varicose veins     status post stripping procedure.       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 1/2 tab twice daily       simvastatin (ZOCOR) 80 MG tablet Take 80 mg by mouth nightly. [address] [facility]       No current facility-administered medications for this visit. Results for orders placed during the hospital encounter of 17   XR CHEST STANDARD (2 VW)    Narrative EXAMINATION: XR CHEST STANDARD TWO VW    CLINICAL HISTORY: 74year-old with respiratory symptoms and positive smoking history. COMPARISONS: Chest x-ray 2012    FINDINGS: Frontal and lateral views the chest were obtained. There are sternotomy wires. Again demonstrated are mediastinal clips. Cardiac silhouette is within normal limits in size. There is slight unfolding of thoracic aorta which does contain   atherosclerotic calcifications. The mediastinal contours are stable. Coarsening of interstitial markings again demonstrated most pronounced in the mid and lower lung zones. No airspace consolidations or worrisome nodules. The no plain film signs of   pleural effusion or pneumothorax. Degenerative changes with endplate spurring again demonstrated in the thoracic spine. Impression COARSE CHRONIC INTERSTITIAL MARKINGS MOST PRONOUNCED IN THE MID AND LOWER LUNG ZONES. NO RADIOGRAPHIC FINDINGS OF ACUTE CARDIOPULMONARY DISEASE.   ]PULMONARY FUNCTION     PATIENT NAME: Anabell Granados                :             1945  MED REC NO:   [id number]                           ROOM:  ACCOUNT NO:   [de-identified]                          ADMISSION DATE:  2017  PROVIDER:     Ken Villanueva MD     DATE OF PROCEDURE:  2017     PFTs were done on this 64-year-old gentleman who is 5 feet 9 inches,  weighs 225 pounds with 50-year smoking history, presenting with  dyspnea.     Spirometry showed a forced vital capacity of 2.8 L which is 68% of  predicted. FEV1 was 1.58 L which is 53% of predicted. FEV1/FVC ratio  was significantly reduced to 56%.   FEF 25-75% was severely reduced to  0.65 L per second which is 30% of predicted. Both FEV1 and terminal  airflow improved significantly after bronchodilators. MVV was  severely reduced.     Lung volumes done by body plethysmography showed a total lung capacity  to 6.16 L which is 90% of predicted. Residual volume was 3.48 L which  is 139% of predicted. RV/TLC ratio was increased to 56%.    Diffusion capacity was mildly reduced to 14.06 which is 73% of  predicted.     Airway resistance was increased. Specific airway conductance was  reduced.     OVERALL IMPRESSION:  This study shows evidence of moderate-to-severe  obstructive ventilatory impairment associated with mild overinflation  and mild diffusion abnormality consistent with chronic obstructive  pulmonary disease. Clinical correlation is needed.     Assessment/Plan:     1. Asthma-COPD overlap syndrome [facility])  Patient had CXR show chronic interstitial chanrges. noactive disease. PFT show moderately severe COPD. Both test reviewed with patient. he is on nebulizer with duoneb QID and pulmicort BID, continue same. Refill for duoneb    2. Tobacco abuse  Patient advised try to quit smoking. Risks related to smoking explained to the patient. Different ways to help him quit smoking were discussed today. 3. Obesity (BMI 30.0-34. 9)  Patient patient is advised try to lose weight. obesity related risk explained to the patient ,  Current weight:  230 lb (104.3 kg) Lbs. BMI:  Body mass index is 34.97 kg/m². 4. Congestive heart failure, unspecified congestive heart failure chronicity, unspecified congestive heart failure type [facility])  Patient is following with cardiologist.      No Follow-up on file.       Orin Cockayne, MD

## 2018-02-26 ENCOUNTER — OFFICE VISIT (OUTPATIENT)
Dept: ENDOCRINOLOGY | Age: 73
End: 2018-02-26
Payer: MEDICARE

## 2018-02-26 VITALS
DIASTOLIC BLOOD PRESSURE: 70 MMHG | WEIGHT: 230.6 LBS | BODY MASS INDEX: 34.95 KG/M2 | TEMPERATURE: 98.1 F | HEART RATE: 73 BPM | HEIGHT: 68 IN | OXYGEN SATURATION: 97 % | SYSTOLIC BLOOD PRESSURE: 124 MMHG

## 2018-02-26 LAB — GLUCOSE BLD-MCNC: 134 MG/DL

## 2018-02-26 PROCEDURE — 1123F ACP DISCUSS/DSCN MKR DOCD: CPT | Performed by: INTERNAL MEDICINE

## 2018-02-26 PROCEDURE — 4040F PNEUMOC VAC/ADMIN/RCVD: CPT | Performed by: INTERNAL MEDICINE

## 2018-02-26 PROCEDURE — G8427 DOCREV CUR MEDS BY ELIG CLIN: HCPCS | Performed by: INTERNAL MEDICINE

## 2018-02-26 PROCEDURE — 82962 GLUCOSE BLOOD TEST: CPT | Performed by: INTERNAL MEDICINE

## 2018-02-26 PROCEDURE — G8484 FLU IMMUNIZE NO ADMIN: HCPCS | Performed by: INTERNAL MEDICINE

## 2018-02-26 PROCEDURE — 3045F PR MOST RECENT HEMOGLOBIN A1C LEVEL 7.0-9.0%: CPT | Performed by: INTERNAL MEDICINE

## 2018-02-26 PROCEDURE — G8417 CALC BMI ABV UP PARAM F/U: HCPCS | Performed by: INTERNAL MEDICINE

## 2018-02-26 PROCEDURE — 3017F COLORECTAL CA SCREEN DOC REV: CPT | Performed by: INTERNAL MEDICINE

## 2018-02-26 PROCEDURE — 4004F PT TOBACCO SCREEN RCVD TLK: CPT | Performed by: INTERNAL MEDICINE

## 2018-02-26 PROCEDURE — G8598 ASA/ANTIPLAT THER USED: HCPCS | Performed by: INTERNAL MEDICINE

## 2018-02-26 PROCEDURE — 99213 OFFICE O/P EST LOW 20 MIN: CPT | Performed by: INTERNAL MEDICINE

## 2018-04-03 ENCOUNTER — OFFICE VISIT (OUTPATIENT)
Dept: FAMILY MEDICINE CLINIC | Age: 73
End: 2018-04-03
Payer: MEDICARE

## 2018-04-03 VITALS
DIASTOLIC BLOOD PRESSURE: 78 MMHG | WEIGHT: 231.4 LBS | BODY MASS INDEX: 35.07 KG/M2 | OXYGEN SATURATION: 95 % | SYSTOLIC BLOOD PRESSURE: 126 MMHG | HEIGHT: 68 IN

## 2018-04-03 DIAGNOSIS — E11.9 TYPE 2 DIABETES MELLITUS WITHOUT COMPLICATION, WITHOUT LONG-TERM CURRENT USE OF INSULIN (HCC): ICD-10-CM

## 2018-04-03 DIAGNOSIS — I10 ESSENTIAL HYPERTENSION: ICD-10-CM

## 2018-04-03 DIAGNOSIS — D47.9 LYMPHOPROLIFERATIVE DISORDER (HCC): ICD-10-CM

## 2018-04-03 DIAGNOSIS — E78.2 MIXED HYPERLIPIDEMIA: Primary | ICD-10-CM

## 2018-04-03 PROCEDURE — G8417 CALC BMI ABV UP PARAM F/U: HCPCS | Performed by: FAMILY MEDICINE

## 2018-04-03 PROCEDURE — 1123F ACP DISCUSS/DSCN MKR DOCD: CPT | Performed by: FAMILY MEDICINE

## 2018-04-03 PROCEDURE — 4004F PT TOBACCO SCREEN RCVD TLK: CPT | Performed by: FAMILY MEDICINE

## 2018-04-03 PROCEDURE — 3017F COLORECTAL CA SCREEN DOC REV: CPT | Performed by: FAMILY MEDICINE

## 2018-04-03 PROCEDURE — 3045F PR MOST RECENT HEMOGLOBIN A1C LEVEL 7.0-9.0%: CPT | Performed by: FAMILY MEDICINE

## 2018-04-03 PROCEDURE — 4040F PNEUMOC VAC/ADMIN/RCVD: CPT | Performed by: FAMILY MEDICINE

## 2018-04-03 PROCEDURE — G8427 DOCREV CUR MEDS BY ELIG CLIN: HCPCS | Performed by: FAMILY MEDICINE

## 2018-04-03 PROCEDURE — 99214 OFFICE O/P EST MOD 30 MIN: CPT | Performed by: FAMILY MEDICINE

## 2018-04-03 PROCEDURE — G8598 ASA/ANTIPLAT THER USED: HCPCS | Performed by: FAMILY MEDICINE

## 2018-04-03 RX ORDER — GLIMEPIRIDE 2 MG/1
TABLET ORAL
Qty: 60 TABLET | Refills: 5 | Status: SHIPPED | OUTPATIENT
Start: 2018-04-03 | End: 2018-09-10 | Stop reason: SDUPTHER

## 2018-04-03 ASSESSMENT — ENCOUNTER SYMPTOMS
ABDOMINAL PAIN: 0
SHORTNESS OF BREATH: 0

## 2018-04-03 ASSESSMENT — PATIENT HEALTH QUESTIONNAIRE - PHQ9
SUM OF ALL RESPONSES TO PHQ QUESTIONS 1-9: 0
2. FEELING DOWN, DEPRESSED OR HOPELESS: 0
SUM OF ALL RESPONSES TO PHQ9 QUESTIONS 1 & 2: 0
1. LITTLE INTEREST OR PLEASURE IN DOING THINGS: 0

## 2018-05-29 LAB
ANION GAP SERPL CALCULATED.3IONS-SCNC: 17 MEQ/L (ref 7–13)
BUN BLDV-MCNC: 28 MG/DL (ref 8–23)
CALCIUM SERPL-MCNC: 8.6 MG/DL (ref 8.6–10.2)
CHLORIDE BLD-SCNC: 94 MEQ/L (ref 98–107)
CO2: 28 MEQ/L (ref 22–29)
CREAT SERPL-MCNC: 1.06 MG/DL (ref 0.7–1.2)
GFR AFRICAN AMERICAN: >60
GFR NON-AFRICAN AMERICAN: >60
GLUCOSE BLD-MCNC: 125 MG/DL (ref 74–109)
HBA1C MFR BLD: 9.2 % (ref 4.8–5.9)
POTASSIUM SERPL-SCNC: 3.6 MEQ/L (ref 3.5–5.1)
SODIUM BLD-SCNC: 139 MEQ/L (ref 132–144)

## 2018-06-08 ENCOUNTER — OFFICE VISIT (OUTPATIENT)
Dept: ENDOCRINOLOGY | Age: 73
End: 2018-06-08
Payer: MEDICARE

## 2018-06-08 VITALS
HEART RATE: 72 BPM | HEIGHT: 68 IN | DIASTOLIC BLOOD PRESSURE: 60 MMHG | SYSTOLIC BLOOD PRESSURE: 120 MMHG | WEIGHT: 233 LBS | BODY MASS INDEX: 35.31 KG/M2

## 2018-06-08 LAB — GLUCOSE BLD-MCNC: 162 MG/DL

## 2018-06-08 PROCEDURE — 2022F DILAT RTA XM EVC RTNOPTHY: CPT | Performed by: INTERNAL MEDICINE

## 2018-06-08 PROCEDURE — G8598 ASA/ANTIPLAT THER USED: HCPCS | Performed by: INTERNAL MEDICINE

## 2018-06-08 PROCEDURE — 1123F ACP DISCUSS/DSCN MKR DOCD: CPT | Performed by: INTERNAL MEDICINE

## 2018-06-08 PROCEDURE — 3017F COLORECTAL CA SCREEN DOC REV: CPT | Performed by: INTERNAL MEDICINE

## 2018-06-08 PROCEDURE — G8417 CALC BMI ABV UP PARAM F/U: HCPCS | Performed by: INTERNAL MEDICINE

## 2018-06-08 PROCEDURE — G8427 DOCREV CUR MEDS BY ELIG CLIN: HCPCS | Performed by: INTERNAL MEDICINE

## 2018-06-08 PROCEDURE — 99213 OFFICE O/P EST LOW 20 MIN: CPT | Performed by: INTERNAL MEDICINE

## 2018-06-08 PROCEDURE — 4040F PNEUMOC VAC/ADMIN/RCVD: CPT | Performed by: INTERNAL MEDICINE

## 2018-06-08 PROCEDURE — 82962 GLUCOSE BLOOD TEST: CPT | Performed by: INTERNAL MEDICINE

## 2018-06-08 PROCEDURE — 3046F HEMOGLOBIN A1C LEVEL >9.0%: CPT | Performed by: INTERNAL MEDICINE

## 2018-06-08 PROCEDURE — 4004F PT TOBACCO SCREEN RCVD TLK: CPT | Performed by: INTERNAL MEDICINE

## 2018-06-08 RX ORDER — LISINOPRIL 5 MG/1
5 TABLET ORAL DAILY
Qty: 30 TABLET | Refills: 3 | Status: SHIPPED | OUTPATIENT
Start: 2018-06-08 | End: 2018-09-10 | Stop reason: SDUPTHER

## 2018-06-21 ENCOUNTER — OFFICE VISIT (OUTPATIENT)
Dept: SURGERY | Age: 73
End: 2018-06-21
Payer: MEDICARE

## 2018-06-21 VITALS
TEMPERATURE: 98.4 F | DIASTOLIC BLOOD PRESSURE: 62 MMHG | WEIGHT: 234 LBS | BODY MASS INDEX: 34.66 KG/M2 | HEIGHT: 69 IN | SYSTOLIC BLOOD PRESSURE: 124 MMHG

## 2018-06-21 DIAGNOSIS — I87.2 STASIS DERMATITIS OF BOTH LEGS: ICD-10-CM

## 2018-06-21 DIAGNOSIS — L97.921 ULCER OF LEFT LOWER EXTREMITY, LIMITED TO BREAKDOWN OF SKIN (HCC): Primary | ICD-10-CM

## 2018-06-21 PROCEDURE — 4004F PT TOBACCO SCREEN RCVD TLK: CPT | Performed by: SURGERY

## 2018-06-21 PROCEDURE — G8598 ASA/ANTIPLAT THER USED: HCPCS | Performed by: SURGERY

## 2018-06-21 PROCEDURE — 4040F PNEUMOC VAC/ADMIN/RCVD: CPT | Performed by: SURGERY

## 2018-06-21 PROCEDURE — 99212 OFFICE O/P EST SF 10 MIN: CPT | Performed by: SURGERY

## 2018-06-21 PROCEDURE — 3017F COLORECTAL CA SCREEN DOC REV: CPT | Performed by: SURGERY

## 2018-06-21 PROCEDURE — 1123F ACP DISCUSS/DSCN MKR DOCD: CPT | Performed by: SURGERY

## 2018-06-21 PROCEDURE — G8427 DOCREV CUR MEDS BY ELIG CLIN: HCPCS | Performed by: SURGERY

## 2018-06-21 PROCEDURE — G8417 CALC BMI ABV UP PARAM F/U: HCPCS | Performed by: SURGERY

## 2018-06-21 RX ORDER — SULFAMETHOXAZOLE AND TRIMETHOPRIM 800; 160 MG/1; MG/1
1 TABLET ORAL 2 TIMES DAILY
Qty: 28 TABLET | Refills: 0 | Status: SHIPPED | OUTPATIENT
Start: 2018-06-21 | End: 2018-07-05

## 2018-06-21 ASSESSMENT — ENCOUNTER SYMPTOMS
BLOOD IN STOOL: 0
RHINORRHEA: 0
COLOR CHANGE: 1
ALLERGIC/IMMUNOLOGIC NEGATIVE: 1
ABDOMINAL DISTENTION: 0
ABDOMINAL PAIN: 0
CHEST TIGHTNESS: 0
SHORTNESS OF BREATH: 1
CONSTIPATION: 0

## 2018-06-28 ENCOUNTER — OFFICE VISIT (OUTPATIENT)
Dept: SURGERY | Age: 73
End: 2018-06-28
Payer: MEDICARE

## 2018-06-28 VITALS
WEIGHT: 230 LBS | DIASTOLIC BLOOD PRESSURE: 78 MMHG | TEMPERATURE: 98.8 F | BODY MASS INDEX: 34.07 KG/M2 | HEIGHT: 69 IN | SYSTOLIC BLOOD PRESSURE: 124 MMHG

## 2018-06-28 DIAGNOSIS — L97.921 ULCER OF LEFT LOWER EXTREMITY, LIMITED TO BREAKDOWN OF SKIN (HCC): Primary | ICD-10-CM

## 2018-06-28 PROCEDURE — G8417 CALC BMI ABV UP PARAM F/U: HCPCS | Performed by: SURGERY

## 2018-06-28 PROCEDURE — G8427 DOCREV CUR MEDS BY ELIG CLIN: HCPCS | Performed by: SURGERY

## 2018-06-28 PROCEDURE — 1123F ACP DISCUSS/DSCN MKR DOCD: CPT | Performed by: SURGERY

## 2018-06-28 PROCEDURE — 99212 OFFICE O/P EST SF 10 MIN: CPT | Performed by: SURGERY

## 2018-06-28 PROCEDURE — 4004F PT TOBACCO SCREEN RCVD TLK: CPT | Performed by: SURGERY

## 2018-06-28 PROCEDURE — G8598 ASA/ANTIPLAT THER USED: HCPCS | Performed by: SURGERY

## 2018-06-28 PROCEDURE — 3017F COLORECTAL CA SCREEN DOC REV: CPT | Performed by: SURGERY

## 2018-06-28 PROCEDURE — 4040F PNEUMOC VAC/ADMIN/RCVD: CPT | Performed by: SURGERY

## 2018-06-28 ASSESSMENT — ENCOUNTER SYMPTOMS
ABDOMINAL PAIN: 0
CHEST TIGHTNESS: 0
COLOR CHANGE: 1
RHINORRHEA: 0
CONSTIPATION: 0
SHORTNESS OF BREATH: 1
BLOOD IN STOOL: 0
ABDOMINAL DISTENTION: 0
ALLERGIC/IMMUNOLOGIC NEGATIVE: 1

## 2018-06-29 ENCOUNTER — OFFICE VISIT (OUTPATIENT)
Dept: PULMONOLOGY | Age: 73
End: 2018-06-29
Payer: MEDICARE

## 2018-06-29 VITALS
HEIGHT: 69 IN | BODY MASS INDEX: 34.07 KG/M2 | HEART RATE: 69 BPM | RESPIRATION RATE: 16 BRPM | SYSTOLIC BLOOD PRESSURE: 118 MMHG | OXYGEN SATURATION: 95 % | WEIGHT: 230 LBS | DIASTOLIC BLOOD PRESSURE: 60 MMHG | TEMPERATURE: 97 F

## 2018-06-29 DIAGNOSIS — I50.9 CONGESTIVE HEART FAILURE, UNSPECIFIED CONGESTIVE HEART FAILURE CHRONICITY, UNSPECIFIED CONGESTIVE HEART FAILURE TYPE: ICD-10-CM

## 2018-06-29 DIAGNOSIS — E66.9 OBESITY (BMI 30-39.9): ICD-10-CM

## 2018-06-29 DIAGNOSIS — J44.9 ASTHMA-COPD OVERLAP SYNDROME (HCC): Primary | ICD-10-CM

## 2018-06-29 DIAGNOSIS — Z72.0 TOBACCO ABUSE: ICD-10-CM

## 2018-06-29 PROCEDURE — G8598 ASA/ANTIPLAT THER USED: HCPCS | Performed by: INTERNAL MEDICINE

## 2018-06-29 PROCEDURE — 99214 OFFICE O/P EST MOD 30 MIN: CPT | Performed by: INTERNAL MEDICINE

## 2018-06-29 PROCEDURE — G8926 SPIRO NO PERF OR DOC: HCPCS | Performed by: INTERNAL MEDICINE

## 2018-06-29 PROCEDURE — 1123F ACP DISCUSS/DSCN MKR DOCD: CPT | Performed by: INTERNAL MEDICINE

## 2018-06-29 PROCEDURE — 3023F SPIROM DOC REV: CPT | Performed by: INTERNAL MEDICINE

## 2018-06-29 PROCEDURE — G8417 CALC BMI ABV UP PARAM F/U: HCPCS | Performed by: INTERNAL MEDICINE

## 2018-06-29 PROCEDURE — G8427 DOCREV CUR MEDS BY ELIG CLIN: HCPCS | Performed by: INTERNAL MEDICINE

## 2018-06-29 PROCEDURE — 4040F PNEUMOC VAC/ADMIN/RCVD: CPT | Performed by: INTERNAL MEDICINE

## 2018-06-29 PROCEDURE — 3017F COLORECTAL CA SCREEN DOC REV: CPT | Performed by: INTERNAL MEDICINE

## 2018-06-29 PROCEDURE — 4004F PT TOBACCO SCREEN RCVD TLK: CPT | Performed by: INTERNAL MEDICINE

## 2018-06-29 ASSESSMENT — ENCOUNTER SYMPTOMS
WHEEZING: 0
VOMITING: 0
DIARRHEA: 0
SHORTNESS OF BREATH: 1
NAUSEA: 0
SORE THROAT: 0
COUGH: 0
ABDOMINAL PAIN: 0
CHEST TIGHTNESS: 0
RHINORRHEA: 0
VOICE CHANGE: 0
EYE ITCHING: 0

## 2018-07-27 ENCOUNTER — OFFICE VISIT (OUTPATIENT)
Dept: SURGERY | Age: 73
End: 2018-07-27
Payer: MEDICARE

## 2018-07-27 VITALS
TEMPERATURE: 98.7 F | BODY MASS INDEX: 33.92 KG/M2 | HEIGHT: 69 IN | SYSTOLIC BLOOD PRESSURE: 110 MMHG | DIASTOLIC BLOOD PRESSURE: 72 MMHG | WEIGHT: 229 LBS

## 2018-07-27 DIAGNOSIS — I87.2 STASIS DERMATITIS OF BOTH LEGS: ICD-10-CM

## 2018-07-27 DIAGNOSIS — L97.921 ULCER OF LEFT LOWER EXTREMITY, LIMITED TO BREAKDOWN OF SKIN (HCC): Primary | ICD-10-CM

## 2018-07-27 PROCEDURE — G8417 CALC BMI ABV UP PARAM F/U: HCPCS | Performed by: SURGERY

## 2018-07-27 PROCEDURE — 1123F ACP DISCUSS/DSCN MKR DOCD: CPT | Performed by: SURGERY

## 2018-07-27 PROCEDURE — G8427 DOCREV CUR MEDS BY ELIG CLIN: HCPCS | Performed by: SURGERY

## 2018-07-27 PROCEDURE — 1101F PT FALLS ASSESS-DOCD LE1/YR: CPT | Performed by: SURGERY

## 2018-07-27 PROCEDURE — 4004F PT TOBACCO SCREEN RCVD TLK: CPT | Performed by: SURGERY

## 2018-07-27 PROCEDURE — G8598 ASA/ANTIPLAT THER USED: HCPCS | Performed by: SURGERY

## 2018-07-27 PROCEDURE — 3017F COLORECTAL CA SCREEN DOC REV: CPT | Performed by: SURGERY

## 2018-07-27 PROCEDURE — 99213 OFFICE O/P EST LOW 20 MIN: CPT | Performed by: SURGERY

## 2018-07-27 PROCEDURE — 4040F PNEUMOC VAC/ADMIN/RCVD: CPT | Performed by: SURGERY

## 2018-07-27 RX ORDER — SULFAMETHOXAZOLE AND TRIMETHOPRIM 800; 160 MG/1; MG/1
1 TABLET ORAL 2 TIMES DAILY
Qty: 28 TABLET | Refills: 0 | Status: SHIPPED | OUTPATIENT
Start: 2018-07-27 | End: 2018-08-10

## 2018-07-27 ASSESSMENT — ENCOUNTER SYMPTOMS
RHINORRHEA: 0
SHORTNESS OF BREATH: 1
ABDOMINAL DISTENTION: 0
ABDOMINAL PAIN: 0
COLOR CHANGE: 1
BLOOD IN STOOL: 0
ALLERGIC/IMMUNOLOGIC NEGATIVE: 1
CHEST TIGHTNESS: 0
CONSTIPATION: 0

## 2018-07-27 NOTE — PROGRESS NOTES
(Temporal)   Ht 5' 9\" (1.753 m)   Wt 229 lb (103.9 kg)   BMI 33.82 kg/m²   Assessment:      blister on the left lower limb  Stasis dermatitis       Plan:      Cleanse daily and apply antibiotic ointment  Bactrim DS  The patient is instructed to return to see me in 4 week  Compression stocking.

## 2018-08-27 ENCOUNTER — OFFICE VISIT (OUTPATIENT)
Dept: SURGERY | Age: 73
End: 2018-08-27
Payer: MEDICARE

## 2018-08-27 VITALS
SYSTOLIC BLOOD PRESSURE: 110 MMHG | BODY MASS INDEX: 33.92 KG/M2 | TEMPERATURE: 98.2 F | WEIGHT: 229 LBS | HEIGHT: 69 IN | DIASTOLIC BLOOD PRESSURE: 70 MMHG

## 2018-08-27 DIAGNOSIS — I87.2 STASIS DERMATITIS OF BOTH LEGS: Primary | ICD-10-CM

## 2018-08-27 DIAGNOSIS — L97.921 ULCER OF LEFT LOWER EXTREMITY, LIMITED TO BREAKDOWN OF SKIN (HCC): ICD-10-CM

## 2018-08-27 PROCEDURE — 1123F ACP DISCUSS/DSCN MKR DOCD: CPT | Performed by: SURGERY

## 2018-08-27 PROCEDURE — 4004F PT TOBACCO SCREEN RCVD TLK: CPT | Performed by: SURGERY

## 2018-08-27 PROCEDURE — G8427 DOCREV CUR MEDS BY ELIG CLIN: HCPCS | Performed by: SURGERY

## 2018-08-27 PROCEDURE — 1101F PT FALLS ASSESS-DOCD LE1/YR: CPT | Performed by: SURGERY

## 2018-08-27 PROCEDURE — 4040F PNEUMOC VAC/ADMIN/RCVD: CPT | Performed by: SURGERY

## 2018-08-27 PROCEDURE — G8417 CALC BMI ABV UP PARAM F/U: HCPCS | Performed by: SURGERY

## 2018-08-27 PROCEDURE — G8598 ASA/ANTIPLAT THER USED: HCPCS | Performed by: SURGERY

## 2018-08-27 PROCEDURE — 3017F COLORECTAL CA SCREEN DOC REV: CPT | Performed by: SURGERY

## 2018-08-27 PROCEDURE — 99212 OFFICE O/P EST SF 10 MIN: CPT | Performed by: SURGERY

## 2018-08-27 ASSESSMENT — ENCOUNTER SYMPTOMS
ALLERGIC/IMMUNOLOGIC NEGATIVE: 1
CONSTIPATION: 0
CHEST TIGHTNESS: 0
BLOOD IN STOOL: 0
COLOR CHANGE: 1
SHORTNESS OF BREATH: 1
ABDOMINAL PAIN: 0
ABDOMINAL DISTENTION: 0
RHINORRHEA: 0

## 2018-08-27 NOTE — PROGRESS NOTES
Subjective:      Patient ID: Willy Garber is a 74 y.o. male. HPI Willy Garber is a 74 y.o. male seen for a new open wound of his left lower leg. The wound resolved on Bactrim and he is not wearing compression stockings because they hurt his legs . The patient is a type two diabetic and has a history of cardiac surgery and the patient chronic swollen legs, especially on the left side. Review of Systems   Constitutional: Negative for activity change, appetite change and unexpected weight change. HENT: Negative for congestion, nosebleeds, postnasal drip, rhinorrhea and sneezing. Eyes: Positive for visual disturbance (macular degeneration right eye). Respiratory: Positive for shortness of breath (COPD). Negative for chest tightness. Cardiovascular: Positive for leg swelling. Negative for chest pain. Gastrointestinal: Negative for abdominal distention, abdominal pain, blood in stool and constipation. Endocrine: Negative. Genitourinary: Negative for difficulty urinating. Musculoskeletal: Negative for arthralgias, gait problem and myalgias. Skin: Positive for color change. Allergic/Immunologic: Negative. Neurological: Positive for tremors and numbness. Negative for dizziness, light-headedness and headaches. Hematological: Bruises/bleeds easily. Psychiatric/Behavioral: Positive for sleep disturbance. Objective:   Physical Exam   Constitutional: He is oriented to person, place, and time. He appears well-nourished. No distress. Musculoskeletal:   Slow steady gait   Neurological: He is alert and oriented to person, place, and time. He displays tremor. Skin: No rash noted. No erythema. Extensive swelling(2+) and stasis dermatitis to both legs   Psychiatric: He has a normal mood and affect.  Judgment and thought content normal.     /70   Temp 98.2 °F (36.8 °C) (Temporal)   Ht 5' 9\" (1.753 m)   Wt 229 lb (103.9 kg)   BMI 33.82 kg/m²   Assessment: blister on the left lower limb, resolved  Stasis dermatitis       Plan:      Return to see me as needed

## 2018-09-04 DIAGNOSIS — N40.0 BENIGN NON-NODULAR PROSTATIC HYPERPLASIA WITHOUT LOWER URINARY TRACT SYMPTOMS: ICD-10-CM

## 2018-09-04 LAB
ANION GAP SERPL CALCULATED.3IONS-SCNC: 17 MEQ/L (ref 7–13)
BUN BLDV-MCNC: 22 MG/DL (ref 8–23)
CALCIUM SERPL-MCNC: 9.2 MG/DL (ref 8.6–10.2)
CHLORIDE BLD-SCNC: 99 MEQ/L (ref 98–107)
CO2: 26 MEQ/L (ref 22–29)
CREAT SERPL-MCNC: 0.98 MG/DL (ref 0.7–1.2)
GFR AFRICAN AMERICAN: >60
GFR NON-AFRICAN AMERICAN: >60
GLUCOSE BLD-MCNC: 128 MG/DL (ref 74–109)
HBA1C MFR BLD: 9.3 % (ref 4.8–5.9)
POTASSIUM SERPL-SCNC: 3.5 MEQ/L (ref 3.5–5.1)
PROSTATE SPECIFIC ANTIGEN: 3.29 NG/ML (ref 0–6.22)
SODIUM BLD-SCNC: 142 MEQ/L (ref 132–144)

## 2018-09-10 ENCOUNTER — OFFICE VISIT (OUTPATIENT)
Dept: ENDOCRINOLOGY | Age: 73
End: 2018-09-10
Payer: MEDICARE

## 2018-09-10 VITALS
DIASTOLIC BLOOD PRESSURE: 72 MMHG | WEIGHT: 229 LBS | HEIGHT: 69 IN | BODY MASS INDEX: 33.92 KG/M2 | SYSTOLIC BLOOD PRESSURE: 124 MMHG | HEART RATE: 70 BPM

## 2018-09-10 LAB — GLUCOSE BLD-MCNC: 149 MG/DL

## 2018-09-10 PROCEDURE — 82962 GLUCOSE BLOOD TEST: CPT | Performed by: INTERNAL MEDICINE

## 2018-09-10 PROCEDURE — G8417 CALC BMI ABV UP PARAM F/U: HCPCS | Performed by: INTERNAL MEDICINE

## 2018-09-10 PROCEDURE — 2022F DILAT RTA XM EVC RTNOPTHY: CPT | Performed by: INTERNAL MEDICINE

## 2018-09-10 PROCEDURE — 1123F ACP DISCUSS/DSCN MKR DOCD: CPT | Performed by: INTERNAL MEDICINE

## 2018-09-10 PROCEDURE — 4040F PNEUMOC VAC/ADMIN/RCVD: CPT | Performed by: INTERNAL MEDICINE

## 2018-09-10 PROCEDURE — G8427 DOCREV CUR MEDS BY ELIG CLIN: HCPCS | Performed by: INTERNAL MEDICINE

## 2018-09-10 PROCEDURE — 99213 OFFICE O/P EST LOW 20 MIN: CPT | Performed by: INTERNAL MEDICINE

## 2018-09-10 PROCEDURE — 3046F HEMOGLOBIN A1C LEVEL >9.0%: CPT | Performed by: INTERNAL MEDICINE

## 2018-09-10 PROCEDURE — 3017F COLORECTAL CA SCREEN DOC REV: CPT | Performed by: INTERNAL MEDICINE

## 2018-09-10 PROCEDURE — 1101F PT FALLS ASSESS-DOCD LE1/YR: CPT | Performed by: INTERNAL MEDICINE

## 2018-09-10 PROCEDURE — G8598 ASA/ANTIPLAT THER USED: HCPCS | Performed by: INTERNAL MEDICINE

## 2018-09-10 PROCEDURE — 4004F PT TOBACCO SCREEN RCVD TLK: CPT | Performed by: INTERNAL MEDICINE

## 2018-09-10 RX ORDER — GLIMEPIRIDE 2 MG/1
TABLET ORAL
Qty: 60 TABLET | Refills: 3 | Status: SHIPPED | OUTPATIENT
Start: 2018-09-10 | End: 2018-10-04 | Stop reason: SDUPTHER

## 2018-09-10 RX ORDER — LISINOPRIL 5 MG/1
5 TABLET ORAL DAILY
Qty: 30 TABLET | Refills: 3 | Status: SHIPPED | OUTPATIENT
Start: 2018-09-10 | End: 2018-12-10 | Stop reason: SDUPTHER

## 2018-09-10 ASSESSMENT — ENCOUNTER SYMPTOMS: SHORTNESS OF BREATH: 1

## 2018-09-10 NOTE — PROGRESS NOTES
(PULMICORT) 0.5 MG/2ML nebulizer suspension, Take 2 mLs by nebulization 2 times daily Panch, Disp: 60 ampule, Rfl: 11    ipratropium-albuterol (DUONEB) 0.5-2.5 (3) MG/3ML SOLN nebulizer solution, Inhale 3 mLs into the lungs every 6 hours As directed by Dr Mag Millard., Disp: 120 mL, Rfl: 11    SITagliptin (JANUVIA) 50 MG tablet, Take 1 tablet by mouth daily, Disp: 30 tablet, Rfl: 3    ammonium lactate (LAC-HYDRIN) 12 % lotion, Apply topically daily Apply topically as needed. , Disp: , Rfl:     furosemide (LASIX) 40 MG tablet, Take 40 mg by mouth See Admin Instructions 1 tab 2-3 times weekly, may increase to 1 tab daily  Calixto, Disp: , Rfl:     Magnesium 500 MG TABS, Take  by mouth., Disp: , Rfl:     aspirin 325 MG tablet, Take 325 mg by mouth daily. , Disp: , Rfl:     hydrochlorothiazide (HYDRODIURIL) 25 MG tablet, Take 25 mg by mouth daily. [address] [facility] [address] [facility] [address], Disp: , Rfl:     metoprolol (LOPRESSOR) 100 MG tablet, Take 50 mg by mouth 2 times daily. 1/2 tab twice daily , Disp: , Rfl:     simvastatin (ZOCOR) 80 MG tablet, Take 80 mg by mouth nightly. [address] [facility] [address], Disp: , Rfl:     Review of Systems   Respiratory: Positive for shortness of breath. Skin: Positive for color change. Neurological: Positive for tremors. All other systems reviewed and are negative. Vitals:    09/10/18 1010   BP: 124/72   Site: Left Upper Arm   Position: Sitting   Cuff Size: Large Adult   Pulse: 70   Weight: 229 lb (103.9 kg)   Height: 5' 9\" (1.753 m)       Objective:   Physical Exam   Constitutional: He appears well-developed and well-nourished. HENT:   Head: Atraumatic. Eyes: Conjunctivae are normal.   Neck: Neck supple. Cardiovascular: Normal rate. Pulmonary/Chest: Effort normal. He has wheezes. Musculoskeletal: Normal range of motion. Neurological: He is alert. Tremors b/l hands    Psychiatric: He has a normal mood and affect. Results for Kyle Golden (MRN [id number]) as of 9/16/2018 14:54   Ref.  Range 9/4/2018 09:30 Sodium Latest Ref Range: 132 - 144 mEq/L 142   Potassium Latest Ref Range: 3.5 - 5.1 mEq/L 3.5   Chloride Latest Ref Range: 98 - 107 mEq/L 99   CO2 Latest Ref Range: 22 - 29 mEq/L 26   BUN Latest Ref Range: 8 - 23 mg/dL 22   Creatinine Latest Ref Range: 0.70 - 1.20 mg/dL 0.98   Anion Gap Latest Ref Range: 7 - 13 mEq/L 17 (H)   GFR Non- Latest Ref Range: >60  >60.0   GFR African American Latest Ref Range: >60  >60.0   Glucose Latest Ref Range: 74 - 109 mg/dL 128 (H)   Calcium Latest Ref Range: 8.6 - 10.2 mg/dL 9.2   Hemoglobin A1C Latest Ref Range: 4.8 - 5.9 % 9.3 (H)       Assessment:       Diagnosis Orders   1.  Uncontrolled type 2 diabetes mellitus with complication, with long-term current use of insulin ([facility])  lisinopril (PRINIVIL;ZESTRIL) 5 MG tablet    insulin glargine (LANTUS SOLOSTAR) 100 UNIT/ML injection pen           Plan:      Orders Placed This Encounter   Procedures    Basic Metabolic Panel     Standing Status:   Future     Standing Expiration Date:   9/10/2019    Hemoglobin A1C     Standing Status:   Future     Standing Expiration Date:   9/10/2019    Microalbumin / Creatinine Urine Ratio     Standing Status:   Future     Standing Expiration Date:   9/10/2019    POCT Glucose     Orders Placed This Encounter   Medications    lisinopril (PRINIVIL;ZESTRIL) 5 MG tablet     Sig: Take 1 tablet by mouth daily     Dispense:  30 tablet     Refill:  3    glimepiride (AMARYL) 2 MG tablet     Sig: TAKE 1 TABLET BY MOUTH TWICE DAILY     Dispense:  60 tablet     Refill:  3    insulin glargine (LANTUS SOLOSTAR) 100 UNIT/ML injection pen     Si units at bedtime     Dispense:  4 pen     Refill:  3     Continue januvia 50 mg daily   hbaic goal of 7 -7.5        Frandy Neal MD

## 2018-09-16 ASSESSMENT — ENCOUNTER SYMPTOMS: COLOR CHANGE: 1

## 2018-09-21 ENCOUNTER — OFFICE VISIT (OUTPATIENT)
Dept: UROLOGY | Age: 73
End: 2018-09-21
Payer: MEDICARE

## 2018-09-21 VITALS
WEIGHT: 225 LBS | BODY MASS INDEX: 33.33 KG/M2 | HEART RATE: 73 BPM | SYSTOLIC BLOOD PRESSURE: 106 MMHG | HEIGHT: 69 IN | DIASTOLIC BLOOD PRESSURE: 60 MMHG

## 2018-09-21 DIAGNOSIS — N40.0 BENIGN PROSTATIC HYPERPLASIA WITHOUT LOWER URINARY TRACT SYMPTOMS: Primary | ICD-10-CM

## 2018-09-21 PROCEDURE — 1101F PT FALLS ASSESS-DOCD LE1/YR: CPT | Performed by: UROLOGY

## 2018-09-21 PROCEDURE — 1123F ACP DISCUSS/DSCN MKR DOCD: CPT | Performed by: UROLOGY

## 2018-09-21 PROCEDURE — G8417 CALC BMI ABV UP PARAM F/U: HCPCS | Performed by: UROLOGY

## 2018-09-21 PROCEDURE — 3017F COLORECTAL CA SCREEN DOC REV: CPT | Performed by: UROLOGY

## 2018-09-21 PROCEDURE — G8598 ASA/ANTIPLAT THER USED: HCPCS | Performed by: UROLOGY

## 2018-09-21 PROCEDURE — 4004F PT TOBACCO SCREEN RCVD TLK: CPT | Performed by: UROLOGY

## 2018-09-21 PROCEDURE — 4040F PNEUMOC VAC/ADMIN/RCVD: CPT | Performed by: UROLOGY

## 2018-09-21 PROCEDURE — 99213 OFFICE O/P EST LOW 20 MIN: CPT | Performed by: UROLOGY

## 2018-09-21 PROCEDURE — G8427 DOCREV CUR MEDS BY ELIG CLIN: HCPCS | Performed by: UROLOGY

## 2018-09-21 NOTE — PROGRESS NOTES
Chaperone for Intimate Exam    1. Was chaperone offered as part of the rooming process? offered, declined   2. If Chaperone is declined by patient, NA: chaperone was available and exam completed  3.  Chaperone is n/a
dermatitis     uses lachyrdrin    Tobacco abuse     Type II diabetes mellitus, uncontrolled ([address]     Varicose veins     status post stripping procedure. 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 Also worked for [employer]. Social History Main Topics    Smoking status: Current Every Day Smoker     Packs/day: 0.30     Years: 46.00     Types: Cigarettes    Smokeless tobacco: Never Used      Comment: pt aware of danger    Alcohol use No      Comment: drinks an occasional beer every 3 months.  Drug use: No    Sexual activity: Yes     Partners: Female     Other Topics Concern    None     Social History Narrative    None     Family History   Problem Relation Age of Onset    Diabetes Father     Heart Disease Father     Heart Disease Mother      Current Outpatient Prescriptions   Medication Sig Dispense Refill    lisinopril (PRINIVIL;ZESTRIL) 5 MG tablet Take 1 tablet by mouth daily 30 tablet 3    glimepiride (AMARYL) 2 MG tablet TAKE 1 TABLET BY MOUTH TWICE DAILY 60 tablet 3    insulin glargine (LANTUS SOLOSTAR) 100 UNIT/ML injection pen 35 units at bedtime 4 pen 3    budesonide (PULMICORT) 0.5 MG/2ML nebulizer suspension Take 2 mLs by nebulization 2 times daily Panch 60 ampule 11    ipratropium-albuterol (DUONEB) 0.5-2.5 (3) MG/3ML SOLN nebulizer solution Inhale 3 mLs into the lungs every 6 hours As directed by Dr Bufford Soulier. 120 mL 11    SITagliptin (JANUVIA) 50 MG tablet Take 1 tablet by mouth daily 30 tablet 3    ammonium lactate (LAC-HYDRIN) 12 % lotion Apply topically daily Apply topically as needed.       furosemide

## 2018-10-04 ENCOUNTER — OFFICE VISIT (OUTPATIENT)
Dept: FAMILY MEDICINE CLINIC | Age: 73
End: 2018-10-04
Payer: MEDICARE

## 2018-10-04 VITALS
BODY MASS INDEX: 33.47 KG/M2 | WEIGHT: 226 LBS | OXYGEN SATURATION: 98 % | DIASTOLIC BLOOD PRESSURE: 60 MMHG | HEIGHT: 69 IN | HEART RATE: 70 BPM | SYSTOLIC BLOOD PRESSURE: 128 MMHG

## 2018-10-04 DIAGNOSIS — I10 ESSENTIAL HYPERTENSION: Primary | ICD-10-CM

## 2018-10-04 DIAGNOSIS — Z72.0 TOBACCO ABUSE: ICD-10-CM

## 2018-10-04 DIAGNOSIS — Z23 NEEDS FLU SHOT: ICD-10-CM

## 2018-10-04 DIAGNOSIS — E78.2 MIXED HYPERLIPIDEMIA: ICD-10-CM

## 2018-10-04 PROCEDURE — 90662 IIV NO PRSV INCREASED AG IM: CPT | Performed by: FAMILY MEDICINE

## 2018-10-04 PROCEDURE — 99214 OFFICE O/P EST MOD 30 MIN: CPT | Performed by: FAMILY MEDICINE

## 2018-10-04 PROCEDURE — G8482 FLU IMMUNIZE ORDER/ADMIN: HCPCS | Performed by: FAMILY MEDICINE

## 2018-10-04 PROCEDURE — G0008 ADMIN INFLUENZA VIRUS VAC: HCPCS | Performed by: FAMILY MEDICINE

## 2018-10-04 PROCEDURE — 3017F COLORECTAL CA SCREEN DOC REV: CPT | Performed by: FAMILY MEDICINE

## 2018-10-04 PROCEDURE — 4040F PNEUMOC VAC/ADMIN/RCVD: CPT | Performed by: FAMILY MEDICINE

## 2018-10-04 PROCEDURE — 1123F ACP DISCUSS/DSCN MKR DOCD: CPT | Performed by: FAMILY MEDICINE

## 2018-10-04 PROCEDURE — G8598 ASA/ANTIPLAT THER USED: HCPCS | Performed by: FAMILY MEDICINE

## 2018-10-04 PROCEDURE — 4004F PT TOBACCO SCREEN RCVD TLK: CPT | Performed by: FAMILY MEDICINE

## 2018-10-04 PROCEDURE — G8417 CALC BMI ABV UP PARAM F/U: HCPCS | Performed by: FAMILY MEDICINE

## 2018-10-04 PROCEDURE — 1101F PT FALLS ASSESS-DOCD LE1/YR: CPT | Performed by: FAMILY MEDICINE

## 2018-10-04 PROCEDURE — 3046F HEMOGLOBIN A1C LEVEL >9.0%: CPT | Performed by: FAMILY MEDICINE

## 2018-10-04 PROCEDURE — 2022F DILAT RTA XM EVC RTNOPTHY: CPT | Performed by: FAMILY MEDICINE

## 2018-10-04 PROCEDURE — G8427 DOCREV CUR MEDS BY ELIG CLIN: HCPCS | Performed by: FAMILY MEDICINE

## 2018-10-04 RX ORDER — GLIMEPIRIDE 2 MG/1
TABLET ORAL
Qty: 60 TABLET | Refills: 11 | Status: SHIPPED | OUTPATIENT
Start: 2018-10-04 | End: 2019-09-11 | Stop reason: SDUPTHER

## 2018-10-04 ASSESSMENT — ENCOUNTER SYMPTOMS
ABDOMINAL PAIN: 0
SHORTNESS OF BREATH: 0

## 2018-10-04 NOTE — PROGRESS NOTES
Subjective  Kristina Hein, 74 y.o. male presents today with:  Chief Complaint   Patient presents with    6 Month Follow-Up       Hypertension   This is a chronic problem. The current episode started more than 1 year ago. The problem has been rapidly improving since onset. The problem is controlled. Pertinent negatives include no chest pain, headaches, palpitations or shortness of breath. There are no associated agents to hypertension. Risk factors for coronary artery disease include dyslipidemia, male gender and obesity. Past treatments include diuretics, ACE inhibitors and beta blockers. The current treatment provides significant improvement. There are no compliance problems. There is no history of kidney disease. Here today for 6 month follow-up on chronic problems including hypertension, hyperlipidemia, and insulin-dependent diabetes that is managed by Dr. Audelia Harvey. Review of Systems   Constitutional: Negative for fever. Respiratory: Negative for shortness of breath. Cardiovascular: Negative for chest pain and palpitations. Gastrointestinal: Negative for abdominal pain. Skin: Negative for rash. Neurological: Negative for headaches. Past Medical History:   Diagnosis Date    BPH (benign prostatic hyperplasia)     Dr. Qiu Hidden CAD (coronary artery disease)     status post CABG,    CH[facility]est[facility]     Chronic venous insufficiency     COPD (chronic obstructive pulmonary disease) ([facility])     Edema of extremities 9/30/2013    Elevated PSA     Enlarged prostate     Hyperlipidemia     Hypertension     Hypoxemia     uses supplemental oxygen at night.  Lymphoproliferative disorder ([facility] [address] 2013    had previous full work up. Dr. Kelly Chanel. High WBC and low PLT    Macular degeneration, right eye     Obesity (BMI 30.0-34. 9)     Osteoarthritis of both knees     Pneumonia     Stasis dermatitis     uses lachyrdrin    Tobacco abuse     Type II diabetes mellitus, Results   Component Value Date    VLDL 24 10/11/2016     Lab Results   Component Value Date    CHOLHDLRATIO 3.4 10/11/2016    CHOLHDLRATIO 3.2 10/05/2012    CHOLHDLRATIO 3.5 04/02/2012       Assessment & Plan    Diagnosis Orders   1. Essential hypertension  CBC Auto Differential   2. Mixed hyperlipidemia  ALT    AST    Lipid Panel   3. Uncontrolled type 2 diabetes mellitus with complication, with long-term current use of insulin (HCC)  glimepiride (AMARYL) 2 MG tablet   4. Tobacco abuse     5. Needs flu shot  INFLUENZA, HIGH DOSE, 65 YRS +, IM, PF, PREFILL SYR, 0.5ML (FLUZONE HD)     BP and lipids controlled so continue same. He will f/u with Dr. Sandra Hamilton for his Diabetes. Tried to  him to quit smoking. He has been told and is aware of dangers of smoking. He will f/u with his specialists as directed. Refill given for amaryl. Flu shot today. Orders Placed This Encounter   Procedures    INFLUENZA, HIGH DOSE, 65 YRS +, IM, PF, PREFILL SYR, 0.5ML (FLUZONE HD)    ALT     Standing Status:   Future     Standing Expiration Date:   10/4/2019    AST     Standing Status:   Future     Standing Expiration Date:   10/4/2019    CBC Auto Differential     Standing Status:   Future     Standing Expiration Date:   10/4/2019    Lipid Panel     Standing Status:   Future     Standing Expiration Date:   10/4/2019     Order Specific Question:   Is Patient Fasting?/# of Hours     Answer:   yes, 12 hours     Orders Placed This Encounter   Medications    glimepiride (AMARYL) 2 MG tablet     Sig: TAKE 1 TABLET BY MOUTH TWICE DAILY     Dispense:  60 tablet     Refill:  11     Medications Discontinued During This Encounter   Medication Reason    glimepiride (AMARYL) 2 MG tablet REORDER     Return in about 6 months (around 4/4/2019).     Chandni Bazan MD

## 2018-10-05 RX ORDER — PEN NEEDLE, DIABETIC 32GX 5/32"
NEEDLE, DISPOSABLE MISCELLANEOUS
Qty: 100 EACH | Refills: 3 | Status: SHIPPED | OUTPATIENT
Start: 2018-10-05 | End: 2018-12-10 | Stop reason: SDUPTHER

## 2018-10-11 ENCOUNTER — HOSPITAL ENCOUNTER (OUTPATIENT)
Dept: GENERAL RADIOLOGY | Age: 73
Discharge: HOME | End: 2018-10-13
Payer: MEDICARE

## 2018-10-11 DIAGNOSIS — J44.9 ASTHMA-COPD OVERLAP SYNDROME (HCC): ICD-10-CM

## 2018-10-11 LAB
A/G RATIO: 1.4 (ref 0.9–2.4)
ALBUMIN: 4.1 G/DL (ref 3.4–5)
ALP BLD-CCNC: 136 U/L (ref 45–117)
ALT SERPL-CCNC: 11 U/L (ref 10–52)
ANION GAP SERPL CALCULATED.3IONS-SCNC: 13 MMOL/L (ref 10–20)
AST SERPL-CCNC: 17 U/L (ref 13–39)
BICARBONATE: 30 MMOL/L (ref 21–32)
BILIRUB SERPL-MCNC: 1.4 MG/DL (ref 0–1.2)
BUN / CREAT RATIO: 19 (ref 5–25)
CALCIUM SERPL-MCNC: 9.3 MG/DL (ref 8.6–10.3)
CHLORIDE BLD-SCNC: 99 MMOL/L (ref 98–107)
CHOLESTEROL/HDL RATIO: 3
CHOLESTEROL: 98 MG/DL
CREAT SERPL-MCNC: 1.19 MG/DL (ref 0.5–1.3)
ERYTHROCYTE [DISTWIDTH] IN BLOOD BY AUTOMATED COUNT: 14.8 % (ref 12–15.4)
ERYTHROCYTE [DISTWIDTH] IN BLOOD BY AUTOMATED COUNT: 46.3 FL (ref 39.3–48.6)
GFR CALCULATED: 59
GLUCOSE: 122 MG/DL (ref 70–100)
HBA1C MFR BLD: 9.5 % (ref 4–6)
HCT VFR BLD CALC: 53.2 % (ref 38.4–54.9)
HDLC SERPL-MCNC: 33 MG/DL
HEMOGLOBIN: 17.2 G/DL (ref 12.8–17.7)
LDL CHOLESTEROL CALCULATED: 49 MG/DL
MCH RBC QN AUTO: 27.6 PG (ref 27.5–32.9)
MCHC RBC AUTO-ENTMCNC: 32.3 G/DL (ref 30.5–35.4)
MCV RBC AUTO: 85.4 FL (ref 83.3–98.2)
NUCLEATED RBCS: 0 /100{WBCS}
PLATELET # BLD: ABNORMAL 10*3/UL (ref 155–404)
PMV BLD AUTO: 12.2 FL (ref 9.9–12.1)
POTASSIUM SERPL-SCNC: 3.7 MMOL/L (ref 3.5–5.1)
RBC: 6.23 10*6/UL (ref 4.08–6.37)
RBCS COUNTED: 0 10*3/UL
SODIUM BLD-SCNC: 138 MMOL/L (ref 136–145)
TOTAL PROTEIN: 7 G/DL (ref 6.4–8.2)
TRIGL SERPL-MCNC: 81 MG/DL
UREA NITROGEN: 23 MG/DL (ref 6–23)
VLDLC SERPL CALC-MCNC: 16 MG/DL
WBC: 12.9 10*3/UL (ref 4.2–11)

## 2018-10-11 PROCEDURE — 71046 X-RAY EXAM CHEST 2 VIEWS: CPT

## 2018-10-30 ENCOUNTER — OFFICE VISIT (OUTPATIENT)
Dept: PULMONOLOGY | Age: 73
End: 2018-10-30
Payer: MEDICARE

## 2018-10-30 VITALS
OXYGEN SATURATION: 95 % | HEART RATE: 76 BPM | HEIGHT: 69 IN | SYSTOLIC BLOOD PRESSURE: 132 MMHG | WEIGHT: 228 LBS | BODY MASS INDEX: 33.77 KG/M2 | RESPIRATION RATE: 16 BRPM | DIASTOLIC BLOOD PRESSURE: 60 MMHG | TEMPERATURE: 97.5 F

## 2018-10-30 DIAGNOSIS — Z72.0 TOBACCO ABUSE: ICD-10-CM

## 2018-10-30 DIAGNOSIS — J44.9 ASTHMA-COPD OVERLAP SYNDROME (HCC): Primary | ICD-10-CM

## 2018-10-30 DIAGNOSIS — E66.9 OBESITY (BMI 30-39.9): ICD-10-CM

## 2018-10-30 PROCEDURE — 1101F PT FALLS ASSESS-DOCD LE1/YR: CPT | Performed by: INTERNAL MEDICINE

## 2018-10-30 PROCEDURE — 99214 OFFICE O/P EST MOD 30 MIN: CPT | Performed by: INTERNAL MEDICINE

## 2018-10-30 PROCEDURE — 4004F PT TOBACCO SCREEN RCVD TLK: CPT | Performed by: INTERNAL MEDICINE

## 2018-10-30 PROCEDURE — 4040F PNEUMOC VAC/ADMIN/RCVD: CPT | Performed by: INTERNAL MEDICINE

## 2018-10-30 PROCEDURE — G8427 DOCREV CUR MEDS BY ELIG CLIN: HCPCS | Performed by: INTERNAL MEDICINE

## 2018-10-30 PROCEDURE — G8598 ASA/ANTIPLAT THER USED: HCPCS | Performed by: INTERNAL MEDICINE

## 2018-10-30 PROCEDURE — 1123F ACP DISCUSS/DSCN MKR DOCD: CPT | Performed by: INTERNAL MEDICINE

## 2018-10-30 PROCEDURE — 3023F SPIROM DOC REV: CPT | Performed by: INTERNAL MEDICINE

## 2018-10-30 PROCEDURE — 3017F COLORECTAL CA SCREEN DOC REV: CPT | Performed by: INTERNAL MEDICINE

## 2018-10-30 PROCEDURE — G8926 SPIRO NO PERF OR DOC: HCPCS | Performed by: INTERNAL MEDICINE

## 2018-10-30 PROCEDURE — G8417 CALC BMI ABV UP PARAM F/U: HCPCS | Performed by: INTERNAL MEDICINE

## 2018-10-30 PROCEDURE — G8482 FLU IMMUNIZE ORDER/ADMIN: HCPCS | Performed by: INTERNAL MEDICINE

## 2018-10-30 ASSESSMENT — ENCOUNTER SYMPTOMS
CHEST TIGHTNESS: 0
WHEEZING: 0
NAUSEA: 0
SORE THROAT: 0
ABDOMINAL PAIN: 0
RHINORRHEA: 0
VOICE CHANGE: 0
SHORTNESS OF BREATH: 1
DIARRHEA: 0
VOMITING: 0
EYE ITCHING: 0
COUGH: 1

## 2018-10-30 NOTE — PROGRESS NOTES
and reactive to light. Conjunctivae and EOM are normal.   Neck: No JVD present. No tracheal deviation present. No thyromegaly present. Cardiovascular: Normal rate and regular rhythm. Exam reveals no gallop and no friction rub. No murmur heard. Pulmonary/Chest: Effort normal and breath sounds normal. No respiratory distress. He has no wheezes. He has no rales. He exhibits no tenderness. diminished Breath sound bilaterally. Abdominal: He exhibits no distension. Musculoskeletal: Normal range of motion. He exhibits edema (chronic changes 1+ edema both leg ). Lymphadenopathy:     He has no cervical adenopathy. Neurological: He is alert and oriented to person, place, and time. No cranial nerve deficit. Skin: Skin is warm and dry. No rash noted. Psychiatric: He has a normal mood and affect. His behavior is normal.       Current Outpatient Prescriptions   Medication Sig Dispense Refill    [facility] UNIFINE PENTIPS PLUS 32G X 4 MM MISC TEST AS DIRECTED DAILY 100 each 3    glimepiride (AMARYL) 2 MG tablet TAKE 1 TABLET BY MOUTH TWICE DAILY 60 tablet 11    lisinopril (PRINIVIL;ZESTRIL) 5 MG tablet Take 1 tablet by mouth daily 30 tablet 3    insulin glargine (LANTUS SOLOSTAR) 100 UNIT/ML injection pen 35 units at bedtime 4 pen 3    budesonide (PULMICORT) 0.5 MG/2ML nebulizer suspension Take 2 mLs by nebulization 2 times daily Panch 60 ampule 11    ipratropium-albuterol (DUONEB) 0.5-2.5 (3) MG/3ML SOLN nebulizer solution Inhale 3 mLs into the lungs every 6 hours As directed by Dr Deanna Leventhal. 120 mL 11    SITagliptin (JANUVIA) 50 MG tablet Take 1 tablet by mouth daily 30 tablet 3    ammonium lactate (LAC-HYDRIN) 12 % lotion Apply topically daily Apply topically as needed.  furosemide (LASIX) 40 MG tablet Take 40 mg by mouth See Admin Instructions 1 tab 2-3 times weekly, may increase to 1 tab daily   Calixto      Magnesium 500 MG TABS Take  by mouth.       aspirin 325 MG tablet Take 325 mg by mouth daily.  hydrochlorothiazide (HYDRODIURIL) 25 MG tablet Take 25 mg by mouth daily. [facility]      metoprolol (LOPRESSOR) 100 MG tablet Take 50 mg by mouth 2 times daily. 1/2 tab twice daily       simvastatin (ZOCOR) 80 MG tablet Take 80 mg by mouth nightly. [facility]       No current facility-administered medications for this visit. Results for orders placed during the hospital encounter of 10/11/18   XR CHEST STANDARD (2 VW)    Narrative EXAMINATION: CHEST TWO VIEWS     CLINICAL HISTORY: J44.9 Asthma-COPD overlap syndrome ([facility] ICD10    COMPARISONS: November 1, 2017     FINDINGS:    Two views of the chest are submitted. There are multiple median sternotomy wires. The cardiac silhouette is borderline enlarged. Unchanged The mediastinum is unremarkable. Pulmonary vascular is attenuated, lungs are hyperinflated and there is some widening of the AP diameter the chest and coarsening of the interstitium. Right sided trachea. Areas of atelectasis left lower lobe  No focal infiltrates. No effusions. Pneumothoraces. Impression NO ACUTE ACTIVE CARDIOPULMONARY PROCESS. RADIOGRAPHIC FINDINGS  OF COPD. Assessment/Plan:     1. Asthma-COPD overlap syndrome ([address]  Patient is using budesonide and duoneb. He said he is using duoneb QID and budesonide 1-2 timea day only, C/o shortness of breath , worse with humid weather and  with exertion. He will continue bronchodilator as before. CXR done show COPD, he does not want to go for PFT. Continue same .      2. Obesity (BMI 30-39. 9)  Patient patient is advised try to lose weight. obesity related risk explained to the patient ,  Current weight:  228 lb (103.4 kg) Lbs. BMI:  Body mass index is 33.67 kg/m². 3. Tobacco abuse  Patient advised try to quit smoking. Risks related to smoking explained to the patient. Different ways to help him quit smoking were discussed

## 2018-11-09 ENCOUNTER — OFFICE VISIT (OUTPATIENT)
Dept: SURGERY | Age: 73
End: 2018-11-09
Payer: MEDICARE

## 2018-11-09 VITALS
DIASTOLIC BLOOD PRESSURE: 70 MMHG | HEIGHT: 69 IN | TEMPERATURE: 98.7 F | BODY MASS INDEX: 33.18 KG/M2 | WEIGHT: 224 LBS | SYSTOLIC BLOOD PRESSURE: 130 MMHG

## 2018-11-09 DIAGNOSIS — I87.2 STASIS DERMATITIS OF BOTH LEGS: ICD-10-CM

## 2018-11-09 DIAGNOSIS — L97.921 ULCER OF LEFT LOWER EXTREMITY, LIMITED TO BREAKDOWN OF SKIN (HCC): Primary | ICD-10-CM

## 2018-11-09 PROCEDURE — G8598 ASA/ANTIPLAT THER USED: HCPCS | Performed by: SURGERY

## 2018-11-09 PROCEDURE — 4040F PNEUMOC VAC/ADMIN/RCVD: CPT | Performed by: SURGERY

## 2018-11-09 PROCEDURE — 3017F COLORECTAL CA SCREEN DOC REV: CPT | Performed by: SURGERY

## 2018-11-09 PROCEDURE — G8427 DOCREV CUR MEDS BY ELIG CLIN: HCPCS | Performed by: SURGERY

## 2018-11-09 PROCEDURE — 1101F PT FALLS ASSESS-DOCD LE1/YR: CPT | Performed by: SURGERY

## 2018-11-09 PROCEDURE — 99213 OFFICE O/P EST LOW 20 MIN: CPT | Performed by: SURGERY

## 2018-11-09 PROCEDURE — G8482 FLU IMMUNIZE ORDER/ADMIN: HCPCS | Performed by: SURGERY

## 2018-11-09 PROCEDURE — 4004F PT TOBACCO SCREEN RCVD TLK: CPT | Performed by: SURGERY

## 2018-11-09 PROCEDURE — G8417 CALC BMI ABV UP PARAM F/U: HCPCS | Performed by: SURGERY

## 2018-11-09 PROCEDURE — 1123F ACP DISCUSS/DSCN MKR DOCD: CPT | Performed by: SURGERY

## 2018-11-09 RX ORDER — SULFAMETHOXAZOLE AND TRIMETHOPRIM 800; 160 MG/1; MG/1
1 TABLET ORAL 2 TIMES DAILY
Qty: 28 TABLET | Refills: 1 | Status: SHIPPED | OUTPATIENT
Start: 2018-11-09 | End: 2018-11-23

## 2018-11-09 ASSESSMENT — ENCOUNTER SYMPTOMS
COLOR CHANGE: 1
CHEST TIGHTNESS: 0
ABDOMINAL DISTENTION: 0
BLOOD IN STOOL: 0
CONSTIPATION: 0
SHORTNESS OF BREATH: 1
ABDOMINAL PAIN: 0
ALLERGIC/IMMUNOLOGIC NEGATIVE: 1
RHINORRHEA: 0

## 2018-11-09 NOTE — PROGRESS NOTES
Subjective:      Patient ID: Cahrly Sanchez is a 74 y.o. male. Other   Associated symptoms include numbness. Pertinent negatives include no abdominal pain, arthralgias, chest pain, congestion, headaches or myalgias. Charly Sanchez is a 74 y.o. male seen for a new open wound of his left lower leg at the same area as before. He is not wearing compression stockings . The patient is a type two diabetic and has a history of cardiac surgery and the patient chronic swollen legs, especially on the left side. Review of Systems   Constitutional: Negative for activity change, appetite change and unexpected weight change. HENT: Negative for congestion, nosebleeds, postnasal drip, rhinorrhea and sneezing. Eyes: Positive for visual disturbance (macular degeneration right eye). Respiratory: Positive for shortness of breath (COPD). Negative for chest tightness. Cardiovascular: Positive for leg swelling. Negative for chest pain. Gastrointestinal: Negative for abdominal distention, abdominal pain, blood in stool and constipation. Endocrine: Negative. Genitourinary: Negative for difficulty urinating. Musculoskeletal: Negative for arthralgias, gait problem and myalgias. Skin: Positive for color change. Allergic/Immunologic: Negative. Neurological: Positive for tremors and numbness. Negative for dizziness, light-headedness and headaches. Hematological: Bruises/bleeds easily. Psychiatric/Behavioral: Positive for sleep disturbance. Objective:   Physical Exam   Constitutional: He is oriented to person, place, and time. He appears well-nourished. No distress. Musculoskeletal:   Slow steady gait   Neurological: He is alert and oriented to person, place, and time. He displays tremor. Skin: No rash noted. No erythema. Extensive swelling(2+) and stasis dermatitis to both legs   Psychiatric: He has a normal mood and affect.  Judgment and thought content normal.     /70   Temp

## 2018-11-16 ENCOUNTER — OFFICE VISIT (OUTPATIENT)
Dept: SURGERY | Age: 73
End: 2018-11-16
Payer: MEDICARE

## 2018-11-16 VITALS
BODY MASS INDEX: 33.62 KG/M2 | HEIGHT: 69 IN | WEIGHT: 227 LBS | TEMPERATURE: 98.2 F | SYSTOLIC BLOOD PRESSURE: 112 MMHG | DIASTOLIC BLOOD PRESSURE: 70 MMHG

## 2018-11-16 DIAGNOSIS — I87.2 STASIS DERMATITIS OF BOTH LEGS: ICD-10-CM

## 2018-11-16 DIAGNOSIS — L97.921 ULCER OF LEFT LOWER EXTREMITY, LIMITED TO BREAKDOWN OF SKIN (HCC): Primary | ICD-10-CM

## 2018-11-16 PROCEDURE — 1123F ACP DISCUSS/DSCN MKR DOCD: CPT | Performed by: SURGERY

## 2018-11-16 PROCEDURE — G8598 ASA/ANTIPLAT THER USED: HCPCS | Performed by: SURGERY

## 2018-11-16 PROCEDURE — G8482 FLU IMMUNIZE ORDER/ADMIN: HCPCS | Performed by: SURGERY

## 2018-11-16 PROCEDURE — 3017F COLORECTAL CA SCREEN DOC REV: CPT | Performed by: SURGERY

## 2018-11-16 PROCEDURE — G8427 DOCREV CUR MEDS BY ELIG CLIN: HCPCS | Performed by: SURGERY

## 2018-11-16 PROCEDURE — 1101F PT FALLS ASSESS-DOCD LE1/YR: CPT | Performed by: SURGERY

## 2018-11-16 PROCEDURE — 4004F PT TOBACCO SCREEN RCVD TLK: CPT | Performed by: SURGERY

## 2018-11-16 PROCEDURE — 99213 OFFICE O/P EST LOW 20 MIN: CPT | Performed by: SURGERY

## 2018-11-16 PROCEDURE — G8417 CALC BMI ABV UP PARAM F/U: HCPCS | Performed by: SURGERY

## 2018-11-16 PROCEDURE — 4040F PNEUMOC VAC/ADMIN/RCVD: CPT | Performed by: SURGERY

## 2018-11-16 ASSESSMENT — ENCOUNTER SYMPTOMS
ABDOMINAL DISTENTION: 0
RHINORRHEA: 0
BLOOD IN STOOL: 0
COLOR CHANGE: 1
ALLERGIC/IMMUNOLOGIC NEGATIVE: 1
SHORTNESS OF BREATH: 1
CHEST TIGHTNESS: 0
ABDOMINAL PAIN: 0
CONSTIPATION: 0

## 2018-11-16 NOTE — PROGRESS NOTES
Subjective:      Patient ID: Melinda Johnson is a 74 y.o. male. Other   Associated symptoms include numbness. Pertinent negatives include no abdominal pain, arthralgias, chest pain, congestion, headaches or myalgias. Melinda Johnson is a 74 y.o. male seen in follow up for an open wound of his left lower leg at the same area as before. He is not wearing compression stockings . The patient is a type two diabetic and has a history of cardiac surgery and the patient chronic swollen legs, especially on the left side. He has an ace wrap and on Bactrim    Review of Systems   Constitutional: Negative for activity change, appetite change and unexpected weight change. HENT: Negative for congestion, nosebleeds, postnasal drip, rhinorrhea and sneezing. Eyes: Positive for visual disturbance (macular degeneration right eye). Respiratory: Positive for shortness of breath (COPD). Negative for chest tightness. Cardiovascular: Positive for leg swelling. Negative for chest pain. Gastrointestinal: Negative for abdominal distention, abdominal pain, blood in stool and constipation. Endocrine: Negative. Genitourinary: Negative for difficulty urinating. Musculoskeletal: Negative for arthralgias, gait problem and myalgias. Skin: Positive for color change. Allergic/Immunologic: Negative. Neurological: Positive for tremors and numbness. Negative for dizziness, light-headedness and headaches. Hematological: Bruises/bleeds easily. Psychiatric/Behavioral: Positive for sleep disturbance. Objective:   Physical Exam   Constitutional: He is oriented to person, place, and time. He appears well-nourished. No distress. Musculoskeletal:   Slow steady gait   Neurological: He is alert and oriented to person, place, and time. He displays tremor. Skin: No rash noted. No erythema. Extensive swelling(2+) and stasis dermatitis to both legs   Psychiatric: He has a normal mood and affect.  Judgment and thought content normal.     /70   Temp 98.2 °F (36.8 °C) (Temporal)   Ht 5' 9\" (1.753 m)   Wt 227 lb (103 kg)   BMI 33.52 kg/m²   Assessment:      blister on the left lower limb, recurrent, improving  Stasis dermatitis       Plan:        Cleanse daily and apply a DSD  Antibiotic ointment  Bactrim DS bid  The patient is instructed to return to see me in 2 weeks   Wear the ace wrap

## 2018-11-27 ENCOUNTER — CARE COORDINATION (OUTPATIENT)
Dept: CARE COORDINATION | Age: 73
End: 2018-11-27

## 2018-11-28 DIAGNOSIS — I10 ESSENTIAL HYPERTENSION: ICD-10-CM

## 2018-11-28 DIAGNOSIS — E78.2 MIXED HYPERLIPIDEMIA: ICD-10-CM

## 2018-11-28 LAB
ALT SERPL-CCNC: 16 U/L (ref 0–41)
ANION GAP SERPL CALCULATED.3IONS-SCNC: 13 MEQ/L (ref 7–13)
AST SERPL-CCNC: 21 U/L (ref 0–40)
ATYPICAL LYMPHOCYTE RELATIVE PERCENT: 6 %
BASOPHILS ABSOLUTE: 0 K/UL (ref 0–0.2)
BASOPHILS RELATIVE PERCENT: 0.5 %
BUN BLDV-MCNC: 28 MG/DL (ref 8–23)
CALCIUM SERPL-MCNC: 9.2 MG/DL (ref 8.6–10.2)
CHLORIDE BLD-SCNC: 95 MEQ/L (ref 98–107)
CHOLESTEROL, TOTAL: 119 MG/DL (ref 0–199)
CO2: 28 MEQ/L (ref 22–29)
CREAT SERPL-MCNC: 1.31 MG/DL (ref 0.7–1.2)
CREATININE URINE: 45.7 MG/DL
EOSINOPHILS ABSOLUTE: 0.3 K/UL (ref 0–0.7)
EOSINOPHILS RELATIVE PERCENT: 3 %
GFR AFRICAN AMERICAN: >60
GFR NON-AFRICAN AMERICAN: 53.2
GLUCOSE BLD-MCNC: 127 MG/DL (ref 74–109)
HBA1C MFR BLD: 9.2 % (ref 4.8–5.9)
HCT VFR BLD CALC: 50.6 % (ref 42–52)
HDLC SERPL-MCNC: 40 MG/DL (ref 40–59)
HEMOGLOBIN: 17 G/DL (ref 14–18)
LDL CHOLESTEROL CALCULATED: 62 MG/DL (ref 0–129)
LYMPHOCYTES ABSOLUTE: 2.3 K/UL (ref 1–4.8)
LYMPHOCYTES RELATIVE PERCENT: 14 %
MCH RBC QN AUTO: 28.9 PG (ref 27–31.3)
MCHC RBC AUTO-ENTMCNC: 33.7 % (ref 33–37)
MCV RBC AUTO: 85.9 FL (ref 80–100)
MICROALBUMIN UR-MCNC: 1.7 MG/DL
MICROALBUMIN/CREAT UR-RTO: 37.2 MG/G (ref 0–30)
MONOCYTES ABSOLUTE: 1 K/UL (ref 0.2–0.8)
MONOCYTES RELATIVE PERCENT: 8.6 %
NEUTROPHILS ABSOLUTE: 8 K/UL (ref 1.4–6.5)
NEUTROPHILS RELATIVE PERCENT: 69 %
PDW BLD-RTO: 15.6 % (ref 11.5–14.5)
PLATELET # BLD: 95 K/UL (ref 130–400)
PLATELET SLIDE REVIEW: ABNORMAL
POTASSIUM SERPL-SCNC: 4.6 MEQ/L (ref 3.5–5.1)
RBC # BLD: 5.88 M/UL (ref 4.7–6.1)
SODIUM BLD-SCNC: 136 MEQ/L (ref 132–144)
TRIGL SERPL-MCNC: 83 MG/DL (ref 0–200)
WBC # BLD: 11.6 K/UL (ref 4.8–10.8)

## 2018-11-30 ENCOUNTER — OFFICE VISIT (OUTPATIENT)
Dept: SURGERY | Age: 73
End: 2018-11-30
Payer: MEDICARE

## 2018-11-30 VITALS
WEIGHT: 227 LBS | DIASTOLIC BLOOD PRESSURE: 68 MMHG | SYSTOLIC BLOOD PRESSURE: 110 MMHG | BODY MASS INDEX: 33.62 KG/M2 | HEIGHT: 69 IN | TEMPERATURE: 97.7 F

## 2018-11-30 DIAGNOSIS — L97.921 ULCER OF LEFT LOWER EXTREMITY, LIMITED TO BREAKDOWN OF SKIN (HCC): Primary | ICD-10-CM

## 2018-11-30 DIAGNOSIS — I87.2 STASIS DERMATITIS OF BOTH LEGS: ICD-10-CM

## 2018-11-30 PROCEDURE — G8598 ASA/ANTIPLAT THER USED: HCPCS | Performed by: SURGERY

## 2018-11-30 PROCEDURE — 1101F PT FALLS ASSESS-DOCD LE1/YR: CPT | Performed by: SURGERY

## 2018-11-30 PROCEDURE — 3017F COLORECTAL CA SCREEN DOC REV: CPT | Performed by: SURGERY

## 2018-11-30 PROCEDURE — G8417 CALC BMI ABV UP PARAM F/U: HCPCS | Performed by: SURGERY

## 2018-11-30 PROCEDURE — 4040F PNEUMOC VAC/ADMIN/RCVD: CPT | Performed by: SURGERY

## 2018-11-30 PROCEDURE — 4004F PT TOBACCO SCREEN RCVD TLK: CPT | Performed by: SURGERY

## 2018-11-30 PROCEDURE — 99213 OFFICE O/P EST LOW 20 MIN: CPT | Performed by: SURGERY

## 2018-11-30 PROCEDURE — G8482 FLU IMMUNIZE ORDER/ADMIN: HCPCS | Performed by: SURGERY

## 2018-11-30 PROCEDURE — 1123F ACP DISCUSS/DSCN MKR DOCD: CPT | Performed by: SURGERY

## 2018-11-30 PROCEDURE — G8427 DOCREV CUR MEDS BY ELIG CLIN: HCPCS | Performed by: SURGERY

## 2018-11-30 ASSESSMENT — ENCOUNTER SYMPTOMS
CONSTIPATION: 0
CHEST TIGHTNESS: 0
ABDOMINAL DISTENTION: 0
SHORTNESS OF BREATH: 1
ABDOMINAL PAIN: 0
BLOOD IN STOOL: 0
RHINORRHEA: 0
COLOR CHANGE: 1
ALLERGIC/IMMUNOLOGIC NEGATIVE: 1

## 2018-12-05 ENCOUNTER — CARE COORDINATION (OUTPATIENT)
Dept: CARE COORDINATION | Age: 73
End: 2018-12-05

## 2018-12-10 ENCOUNTER — OFFICE VISIT (OUTPATIENT)
Dept: ENDOCRINOLOGY | Age: 73
End: 2018-12-10
Payer: MEDICARE

## 2018-12-10 VITALS
HEART RATE: 71 BPM | WEIGHT: 221 LBS | SYSTOLIC BLOOD PRESSURE: 119 MMHG | BODY MASS INDEX: 32.73 KG/M2 | OXYGEN SATURATION: 93 % | HEIGHT: 69 IN | DIASTOLIC BLOOD PRESSURE: 75 MMHG

## 2018-12-10 DIAGNOSIS — N18.30 STAGE 3 CHRONIC KIDNEY DISEASE (HCC): Primary | ICD-10-CM

## 2018-12-10 LAB — GLUCOSE BLD-MCNC: 116 MG/DL

## 2018-12-10 PROCEDURE — 1101F PT FALLS ASSESS-DOCD LE1/YR: CPT | Performed by: INTERNAL MEDICINE

## 2018-12-10 PROCEDURE — G8417 CALC BMI ABV UP PARAM F/U: HCPCS | Performed by: INTERNAL MEDICINE

## 2018-12-10 PROCEDURE — 1123F ACP DISCUSS/DSCN MKR DOCD: CPT | Performed by: INTERNAL MEDICINE

## 2018-12-10 PROCEDURE — 3017F COLORECTAL CA SCREEN DOC REV: CPT | Performed by: INTERNAL MEDICINE

## 2018-12-10 PROCEDURE — G8482 FLU IMMUNIZE ORDER/ADMIN: HCPCS | Performed by: INTERNAL MEDICINE

## 2018-12-10 PROCEDURE — 3046F HEMOGLOBIN A1C LEVEL >9.0%: CPT | Performed by: INTERNAL MEDICINE

## 2018-12-10 PROCEDURE — 4004F PT TOBACCO SCREEN RCVD TLK: CPT | Performed by: INTERNAL MEDICINE

## 2018-12-10 PROCEDURE — G8427 DOCREV CUR MEDS BY ELIG CLIN: HCPCS | Performed by: INTERNAL MEDICINE

## 2018-12-10 PROCEDURE — G8598 ASA/ANTIPLAT THER USED: HCPCS | Performed by: INTERNAL MEDICINE

## 2018-12-10 PROCEDURE — 4040F PNEUMOC VAC/ADMIN/RCVD: CPT | Performed by: INTERNAL MEDICINE

## 2018-12-10 PROCEDURE — 82962 GLUCOSE BLOOD TEST: CPT | Performed by: INTERNAL MEDICINE

## 2018-12-10 PROCEDURE — 99213 OFFICE O/P EST LOW 20 MIN: CPT | Performed by: INTERNAL MEDICINE

## 2018-12-10 PROCEDURE — 2022F DILAT RTA XM EVC RTNOPTHY: CPT | Performed by: INTERNAL MEDICINE

## 2018-12-10 RX ORDER — LISINOPRIL 5 MG/1
5 TABLET ORAL DAILY
Qty: 30 TABLET | Refills: 3 | Status: SHIPPED | OUTPATIENT
Start: 2018-12-10 | End: 2019-03-11 | Stop reason: SDUPTHER

## 2018-12-10 ASSESSMENT — ENCOUNTER SYMPTOMS
WHEEZING: 1
SHORTNESS OF BREATH: 1

## 2018-12-13 ENCOUNTER — OFFICE VISIT (OUTPATIENT)
Dept: SURGERY | Age: 73
End: 2018-12-13
Payer: MEDICARE

## 2018-12-13 VITALS
TEMPERATURE: 97.7 F | BODY MASS INDEX: 33.62 KG/M2 | HEIGHT: 69 IN | SYSTOLIC BLOOD PRESSURE: 126 MMHG | WEIGHT: 227 LBS | DIASTOLIC BLOOD PRESSURE: 78 MMHG

## 2018-12-13 DIAGNOSIS — L97.921 ULCER OF LEFT LOWER EXTREMITY, LIMITED TO BREAKDOWN OF SKIN (HCC): Primary | ICD-10-CM

## 2018-12-13 PROCEDURE — 1123F ACP DISCUSS/DSCN MKR DOCD: CPT | Performed by: SURGERY

## 2018-12-13 PROCEDURE — G8598 ASA/ANTIPLAT THER USED: HCPCS | Performed by: SURGERY

## 2018-12-13 PROCEDURE — G8482 FLU IMMUNIZE ORDER/ADMIN: HCPCS | Performed by: SURGERY

## 2018-12-13 PROCEDURE — 99213 OFFICE O/P EST LOW 20 MIN: CPT | Performed by: SURGERY

## 2018-12-13 PROCEDURE — G8417 CALC BMI ABV UP PARAM F/U: HCPCS | Performed by: SURGERY

## 2018-12-13 PROCEDURE — 4004F PT TOBACCO SCREEN RCVD TLK: CPT | Performed by: SURGERY

## 2018-12-13 PROCEDURE — G8427 DOCREV CUR MEDS BY ELIG CLIN: HCPCS | Performed by: SURGERY

## 2018-12-13 PROCEDURE — 1101F PT FALLS ASSESS-DOCD LE1/YR: CPT | Performed by: SURGERY

## 2018-12-13 PROCEDURE — 4040F PNEUMOC VAC/ADMIN/RCVD: CPT | Performed by: SURGERY

## 2018-12-13 PROCEDURE — 3017F COLORECTAL CA SCREEN DOC REV: CPT | Performed by: SURGERY

## 2018-12-13 RX ORDER — SULFAMETHOXAZOLE AND TRIMETHOPRIM 800; 160 MG/1; MG/1
1 TABLET ORAL 2 TIMES DAILY
Qty: 28 TABLET | Refills: 1 | Status: SHIPPED | OUTPATIENT
Start: 2018-12-13 | End: 2018-12-27

## 2018-12-13 ASSESSMENT — ENCOUNTER SYMPTOMS
CHEST TIGHTNESS: 0
SHORTNESS OF BREATH: 1
BLOOD IN STOOL: 0
ABDOMINAL PAIN: 0
RHINORRHEA: 0
ABDOMINAL DISTENTION: 0
CONSTIPATION: 0
ALLERGIC/IMMUNOLOGIC NEGATIVE: 1
COLOR CHANGE: 1

## 2019-01-17 ENCOUNTER — OFFICE VISIT (OUTPATIENT)
Dept: SURGERY | Age: 74
End: 2019-01-17
Payer: MEDICARE

## 2019-01-17 VITALS
WEIGHT: 227.4 LBS | TEMPERATURE: 97.8 F | BODY MASS INDEX: 33.68 KG/M2 | HEIGHT: 69 IN | SYSTOLIC BLOOD PRESSURE: 120 MMHG | DIASTOLIC BLOOD PRESSURE: 66 MMHG

## 2019-01-17 DIAGNOSIS — S81.811A LACERATION OF RIGHT LOWER EXTREMITY, INITIAL ENCOUNTER: Primary | ICD-10-CM

## 2019-01-17 PROCEDURE — G8427 DOCREV CUR MEDS BY ELIG CLIN: HCPCS | Performed by: SURGERY

## 2019-01-17 PROCEDURE — 4040F PNEUMOC VAC/ADMIN/RCVD: CPT | Performed by: SURGERY

## 2019-01-17 PROCEDURE — 1123F ACP DISCUSS/DSCN MKR DOCD: CPT | Performed by: SURGERY

## 2019-01-17 PROCEDURE — G8598 ASA/ANTIPLAT THER USED: HCPCS | Performed by: SURGERY

## 2019-01-17 PROCEDURE — G8417 CALC BMI ABV UP PARAM F/U: HCPCS | Performed by: SURGERY

## 2019-01-17 PROCEDURE — 1101F PT FALLS ASSESS-DOCD LE1/YR: CPT | Performed by: SURGERY

## 2019-01-17 PROCEDURE — G8482 FLU IMMUNIZE ORDER/ADMIN: HCPCS | Performed by: SURGERY

## 2019-01-17 PROCEDURE — 3017F COLORECTAL CA SCREEN DOC REV: CPT | Performed by: SURGERY

## 2019-01-17 PROCEDURE — 4004F PT TOBACCO SCREEN RCVD TLK: CPT | Performed by: SURGERY

## 2019-01-17 PROCEDURE — 99213 OFFICE O/P EST LOW 20 MIN: CPT | Performed by: SURGERY

## 2019-01-17 ASSESSMENT — ENCOUNTER SYMPTOMS
ALLERGIC/IMMUNOLOGIC NEGATIVE: 1
CHEST TIGHTNESS: 0
COLOR CHANGE: 1
ABDOMINAL PAIN: 0
BLOOD IN STOOL: 0
ABDOMINAL DISTENTION: 0
SHORTNESS OF BREATH: 1
CONSTIPATION: 0
RHINORRHEA: 0

## 2019-01-24 ENCOUNTER — OFFICE VISIT (OUTPATIENT)
Dept: SURGERY | Age: 74
End: 2019-01-24
Payer: MEDICARE

## 2019-01-24 VITALS
HEIGHT: 69 IN | TEMPERATURE: 97.6 F | DIASTOLIC BLOOD PRESSURE: 76 MMHG | SYSTOLIC BLOOD PRESSURE: 122 MMHG | WEIGHT: 227 LBS | BODY MASS INDEX: 33.62 KG/M2

## 2019-01-24 DIAGNOSIS — S81.811D LACERATION OF RIGHT LOWER EXTREMITY, SUBSEQUENT ENCOUNTER: Primary | ICD-10-CM

## 2019-01-24 PROCEDURE — G8427 DOCREV CUR MEDS BY ELIG CLIN: HCPCS | Performed by: SURGERY

## 2019-01-24 PROCEDURE — G8482 FLU IMMUNIZE ORDER/ADMIN: HCPCS | Performed by: SURGERY

## 2019-01-24 PROCEDURE — 3017F COLORECTAL CA SCREEN DOC REV: CPT | Performed by: SURGERY

## 2019-01-24 PROCEDURE — 1123F ACP DISCUSS/DSCN MKR DOCD: CPT | Performed by: SURGERY

## 2019-01-24 PROCEDURE — G8417 CALC BMI ABV UP PARAM F/U: HCPCS | Performed by: SURGERY

## 2019-01-24 PROCEDURE — 1101F PT FALLS ASSESS-DOCD LE1/YR: CPT | Performed by: SURGERY

## 2019-01-24 PROCEDURE — 99213 OFFICE O/P EST LOW 20 MIN: CPT | Performed by: SURGERY

## 2019-01-24 PROCEDURE — 4004F PT TOBACCO SCREEN RCVD TLK: CPT | Performed by: SURGERY

## 2019-01-24 PROCEDURE — G8598 ASA/ANTIPLAT THER USED: HCPCS | Performed by: SURGERY

## 2019-01-24 PROCEDURE — 4040F PNEUMOC VAC/ADMIN/RCVD: CPT | Performed by: SURGERY

## 2019-01-24 ASSESSMENT — ENCOUNTER SYMPTOMS
BLOOD IN STOOL: 0
ABDOMINAL PAIN: 0
CONSTIPATION: 0
ABDOMINAL DISTENTION: 0
SHORTNESS OF BREATH: 1
RHINORRHEA: 0
COLOR CHANGE: 1
CHEST TIGHTNESS: 0
ALLERGIC/IMMUNOLOGIC NEGATIVE: 1

## 2019-02-08 ENCOUNTER — OFFICE VISIT (OUTPATIENT)
Dept: SURGERY | Age: 74
End: 2019-02-08
Payer: MEDICARE

## 2019-02-08 VITALS
BODY MASS INDEX: 33.62 KG/M2 | WEIGHT: 227 LBS | DIASTOLIC BLOOD PRESSURE: 80 MMHG | SYSTOLIC BLOOD PRESSURE: 124 MMHG | HEIGHT: 69 IN | TEMPERATURE: 98.1 F

## 2019-02-08 DIAGNOSIS — S81.811D LACERATION OF RIGHT LOWER EXTREMITY, SUBSEQUENT ENCOUNTER: Primary | ICD-10-CM

## 2019-02-08 PROCEDURE — G8417 CALC BMI ABV UP PARAM F/U: HCPCS | Performed by: SURGERY

## 2019-02-08 PROCEDURE — G8482 FLU IMMUNIZE ORDER/ADMIN: HCPCS | Performed by: SURGERY

## 2019-02-08 PROCEDURE — 1123F ACP DISCUSS/DSCN MKR DOCD: CPT | Performed by: SURGERY

## 2019-02-08 PROCEDURE — 1101F PT FALLS ASSESS-DOCD LE1/YR: CPT | Performed by: SURGERY

## 2019-02-08 PROCEDURE — 4004F PT TOBACCO SCREEN RCVD TLK: CPT | Performed by: SURGERY

## 2019-02-08 PROCEDURE — G8427 DOCREV CUR MEDS BY ELIG CLIN: HCPCS | Performed by: SURGERY

## 2019-02-08 PROCEDURE — 3017F COLORECTAL CA SCREEN DOC REV: CPT | Performed by: SURGERY

## 2019-02-08 PROCEDURE — G8598 ASA/ANTIPLAT THER USED: HCPCS | Performed by: SURGERY

## 2019-02-08 PROCEDURE — 99213 OFFICE O/P EST LOW 20 MIN: CPT | Performed by: SURGERY

## 2019-02-08 PROCEDURE — 4040F PNEUMOC VAC/ADMIN/RCVD: CPT | Performed by: SURGERY

## 2019-03-01 ENCOUNTER — OFFICE VISIT (OUTPATIENT)
Dept: SURGERY | Age: 74
End: 2019-03-01
Payer: MEDICARE

## 2019-03-01 VITALS
HEIGHT: 69 IN | DIASTOLIC BLOOD PRESSURE: 62 MMHG | BODY MASS INDEX: 33.47 KG/M2 | SYSTOLIC BLOOD PRESSURE: 118 MMHG | TEMPERATURE: 98.2 F | WEIGHT: 226 LBS

## 2019-03-01 DIAGNOSIS — S81.811D LACERATION OF RIGHT LOWER EXTREMITY, SUBSEQUENT ENCOUNTER: Primary | ICD-10-CM

## 2019-03-01 LAB
ANION GAP SERPL CALCULATED.3IONS-SCNC: 17 MEQ/L (ref 9–15)
BUN BLDV-MCNC: 28 MG/DL (ref 8–23)
CALCIUM SERPL-MCNC: 9.6 MG/DL (ref 8.5–9.9)
CHLORIDE BLD-SCNC: 93 MEQ/L (ref 95–107)
CO2: 29 MEQ/L (ref 20–31)
CREAT SERPL-MCNC: 0.79 MG/DL (ref 0.7–1.2)
GFR AFRICAN AMERICAN: >60
GFR NON-AFRICAN AMERICAN: >60
GLUCOSE BLD-MCNC: 216 MG/DL (ref 70–99)
HBA1C MFR BLD: 10 % (ref 4.8–5.9)
POTASSIUM SERPL-SCNC: 3.8 MEQ/L (ref 3.4–4.9)
SODIUM BLD-SCNC: 139 MEQ/L (ref 135–144)

## 2019-03-01 PROCEDURE — G8482 FLU IMMUNIZE ORDER/ADMIN: HCPCS | Performed by: SURGERY

## 2019-03-01 PROCEDURE — 1101F PT FALLS ASSESS-DOCD LE1/YR: CPT | Performed by: SURGERY

## 2019-03-01 PROCEDURE — 1123F ACP DISCUSS/DSCN MKR DOCD: CPT | Performed by: SURGERY

## 2019-03-01 PROCEDURE — G8427 DOCREV CUR MEDS BY ELIG CLIN: HCPCS | Performed by: SURGERY

## 2019-03-01 PROCEDURE — 4040F PNEUMOC VAC/ADMIN/RCVD: CPT | Performed by: SURGERY

## 2019-03-01 PROCEDURE — G8598 ASA/ANTIPLAT THER USED: HCPCS | Performed by: SURGERY

## 2019-03-01 PROCEDURE — G8417 CALC BMI ABV UP PARAM F/U: HCPCS | Performed by: SURGERY

## 2019-03-01 PROCEDURE — 4004F PT TOBACCO SCREEN RCVD TLK: CPT | Performed by: SURGERY

## 2019-03-01 PROCEDURE — 99213 OFFICE O/P EST LOW 20 MIN: CPT | Performed by: SURGERY

## 2019-03-11 ENCOUNTER — OFFICE VISIT (OUTPATIENT)
Dept: ENDOCRINOLOGY | Age: 74
End: 2019-03-11
Payer: MEDICARE

## 2019-03-11 VITALS
SYSTOLIC BLOOD PRESSURE: 132 MMHG | BODY MASS INDEX: 33.47 KG/M2 | HEART RATE: 69 BPM | OXYGEN SATURATION: 95 % | HEIGHT: 69 IN | DIASTOLIC BLOOD PRESSURE: 73 MMHG | WEIGHT: 226 LBS

## 2019-03-11 LAB — GLUCOSE BLD-MCNC: 146 MG/DL

## 2019-03-11 PROCEDURE — 4004F PT TOBACCO SCREEN RCVD TLK: CPT | Performed by: INTERNAL MEDICINE

## 2019-03-11 PROCEDURE — G8427 DOCREV CUR MEDS BY ELIG CLIN: HCPCS | Performed by: INTERNAL MEDICINE

## 2019-03-11 PROCEDURE — 1101F PT FALLS ASSESS-DOCD LE1/YR: CPT | Performed by: INTERNAL MEDICINE

## 2019-03-11 PROCEDURE — 82962 GLUCOSE BLOOD TEST: CPT | Performed by: INTERNAL MEDICINE

## 2019-03-11 PROCEDURE — 4040F PNEUMOC VAC/ADMIN/RCVD: CPT | Performed by: INTERNAL MEDICINE

## 2019-03-11 PROCEDURE — 99213 OFFICE O/P EST LOW 20 MIN: CPT | Performed by: INTERNAL MEDICINE

## 2019-03-11 PROCEDURE — G8482 FLU IMMUNIZE ORDER/ADMIN: HCPCS | Performed by: INTERNAL MEDICINE

## 2019-03-11 PROCEDURE — G8598 ASA/ANTIPLAT THER USED: HCPCS | Performed by: INTERNAL MEDICINE

## 2019-03-11 PROCEDURE — G8417 CALC BMI ABV UP PARAM F/U: HCPCS | Performed by: INTERNAL MEDICINE

## 2019-03-11 PROCEDURE — 1123F ACP DISCUSS/DSCN MKR DOCD: CPT | Performed by: INTERNAL MEDICINE

## 2019-03-11 RX ORDER — LISINOPRIL 5 MG/1
5 TABLET ORAL DAILY
Qty: 30 TABLET | Refills: 3 | Status: SHIPPED | OUTPATIENT
Start: 2019-03-11 | End: 2019-06-11 | Stop reason: SDUPTHER

## 2019-03-11 ASSESSMENT — ENCOUNTER SYMPTOMS: SHORTNESS OF BREATH: 1

## 2019-04-04 ENCOUNTER — OFFICE VISIT (OUTPATIENT)
Dept: FAMILY MEDICINE CLINIC | Age: 74
End: 2019-04-04
Payer: MEDICARE

## 2019-04-04 VITALS
WEIGHT: 228 LBS | DIASTOLIC BLOOD PRESSURE: 60 MMHG | HEART RATE: 68 BPM | SYSTOLIC BLOOD PRESSURE: 122 MMHG | BODY MASS INDEX: 33.77 KG/M2 | HEIGHT: 69 IN | TEMPERATURE: 97.1 F | RESPIRATION RATE: 16 BRPM | OXYGEN SATURATION: 96 %

## 2019-04-04 DIAGNOSIS — I50.9 CONGESTIVE HEART FAILURE, UNSPECIFIED HF CHRONICITY, UNSPECIFIED HEART FAILURE TYPE (HCC): ICD-10-CM

## 2019-04-04 DIAGNOSIS — J44.9 ASTHMA-COPD OVERLAP SYNDROME (HCC): ICD-10-CM

## 2019-04-04 DIAGNOSIS — D47.9 LYMPHOPROLIFERATIVE DISORDER (HCC): ICD-10-CM

## 2019-04-04 DIAGNOSIS — J44.9 CHRONIC OBSTRUCTIVE PULMONARY DISEASE, UNSPECIFIED COPD TYPE (HCC): ICD-10-CM

## 2019-04-04 DIAGNOSIS — E11.9 TYPE 2 DIABETES MELLITUS WITHOUT COMPLICATION, WITHOUT LONG-TERM CURRENT USE OF INSULIN (HCC): Primary | ICD-10-CM

## 2019-04-04 PROBLEM — L97.921 ULCER OF LEFT LOWER EXTREMITY, LIMITED TO BREAKDOWN OF SKIN (HCC): Status: RESOLVED | Noted: 2018-06-21 | Resolved: 2019-04-04

## 2019-04-04 PROCEDURE — 4040F PNEUMOC VAC/ADMIN/RCVD: CPT | Performed by: FAMILY MEDICINE

## 2019-04-04 PROCEDURE — G8926 SPIRO NO PERF OR DOC: HCPCS | Performed by: FAMILY MEDICINE

## 2019-04-04 PROCEDURE — 4004F PT TOBACCO SCREEN RCVD TLK: CPT | Performed by: FAMILY MEDICINE

## 2019-04-04 PROCEDURE — 1123F ACP DISCUSS/DSCN MKR DOCD: CPT | Performed by: FAMILY MEDICINE

## 2019-04-04 PROCEDURE — G8417 CALC BMI ABV UP PARAM F/U: HCPCS | Performed by: FAMILY MEDICINE

## 2019-04-04 PROCEDURE — 3023F SPIROM DOC REV: CPT | Performed by: FAMILY MEDICINE

## 2019-04-04 PROCEDURE — 99213 OFFICE O/P EST LOW 20 MIN: CPT | Performed by: FAMILY MEDICINE

## 2019-04-04 PROCEDURE — G8598 ASA/ANTIPLAT THER USED: HCPCS | Performed by: FAMILY MEDICINE

## 2019-04-04 PROCEDURE — G8427 DOCREV CUR MEDS BY ELIG CLIN: HCPCS | Performed by: FAMILY MEDICINE

## 2019-04-04 ASSESSMENT — ENCOUNTER SYMPTOMS
ABDOMINAL PAIN: 0
SHORTNESS OF BREATH: 0

## 2019-04-04 NOTE — PROGRESS NOTES
Subjective  Lorne Fus, 66 y.o. male presents today with:  Chief Complaint   Patient presents with    Follow-up     six month f/u        Hypertension   This is a chronic problem. The current episode started more than 1 year ago. The problem has been rapidly improving since onset. The problem is controlled. Pertinent negatives include no chest pain, headaches, palpitations or shortness of breath. There are no associated agents to hypertension. Risk factors for coronary artery disease include dyslipidemia, male gender and obesity. Past treatments include diuretics, ACE inhibitors and beta blockers. The current treatment provides significant improvement. There are no compliance problems. There is no history of kidney disease. Here today for 6 month follow-up on chronic problems including hypertension, hyperlipidemia, and insulin-dependent diabetes that is managed by Dr. Beth Bright. Follows with Dr. Yesenia Gomez for prostate    Dr. Roger Vázquez for Heart    Continues to smoke a few cigarettes every day    Strong fam hx of CAD/DM  Several brothers  from heart dx    Patient Active Problem List   Diagnosis    Diabetes mellitus, [facility] 2 ([facility]    Coronary artery disease    COPD (chronic obstructive pulmonary disease) ([facility]    Hypertension    Edema of extremities    Essential hypertension, benign    Osteoarthritis of knee    Tobacco abuse    BPH (benign prostatic hyperplasia)    Hyperlipidemia    Obesity (BMI 30.0-34. 9)    Stasis dermatitis of both legs    CHF (congestive heart failure) (HCC)    Lymphoproliferative disorder (HCC)    Asthma-COPD overlap syndrome (HCC)    Laceration of right lower extremity         Review of Systems   Constitutional: Negative for fever. Respiratory: Negative for shortness of breath. Cardiovascular: Negative for chest pain and palpitations. Gastrointestinal: Negative for abdominal pain. Skin: Negative for rash. Neurological: Negative for headaches. Past Medical History:   Diagnosis Date    BPH (benign prostatic hyperplasia)     Dr. Queenie Matthew CAD (coronary artery disease)     status post CABG,    CHF (congestive heart failure) (HCC)     Chronic venous insufficiency     COPD (chronic obstructive pulmonary disease) (HCC)     Edema of extremities 9/30/2013    Elevated PSA     Enlarged prostate     Hyperlipidemia     Hypertension     Hypoxemia     uses supplemental oxygen at night.  [facility]rol[facility] ([facility] 2013    had previous full work up. Dr. Emelina Pedro. High WBC and low PLT    Macular degeneration, right eye     Obesity (BMI 30.0-34. 9)     Osteoarthritis of both knees     Pneumonia     Stasis dermatitis     uses lachyrdrin    Tobacco abuse     Type II diabetes mellitus, uncontrolled [facility]     Varicose veins     status post stripping procedure. 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 Also worked for [employer]. Social Needs    Financial resource strain: Not on file    Food insecurity:     Worry: Not on file     Inability: Not on file    Transportation needs:     Medical: Not on file     Non-medical: Not on file   Tobacco Use    Smoking status: Current Every Day Smoker     Packs/day: 0.30     Years: 46.00     Pack years: 13.80     Types: Cigarettes    Smokeless tobacco: Never Used    Tobacco comment: pt aware of danger   Substance and Sexual Activity    Alcohol use: No     Comment: drinks an occasional beer every 3 months.     Drug use: No    Sexual activity: Yes     Partners: Female   Lifestyle    Physical activity:     Days per week: Not on file     Minutes per session: Not on file    Stress: Not on file   Relationships    Social connections:     Talks on phone: Not on file     Gets together: Not on file     Attends [employer] service: Not on file     Active member of club or organization: Not on file     Attends meetings of clubs or organizations: Not on file     Relationship status: Not on file    Intimate partner violence:     Fear of current or ex partner: Not on file     Emotionally abused: Not on file     Physically abused: Not on file     Forced sexual activity: Not on file   Other Topics Concern    Not on file   Social History Narrative    Not on file     Family History   Problem Relation Age of Onset    Diabetes Father     Heart Disease Father     Heart Disease Mother      No Known Allergies  Current Outpatient Medications   Medication Sig Dispense Refill    insulin glargine (LANTUS SOLOSTAR) 100 UNIT/ML injection pen 45 units at bedtime 10 pen 3    lisinopril (PRINIVIL;ZESTRIL) 5 MG tablet Take 1 tablet by mouth daily 30 tablet 3    linagliptin (TRADJENTA) 5 MG tablet Use as directed 28 tablet 0    SITagliptin (JANUVIA) 100 MG tablet Use as directed 42 tablet 0    SITagliptin (JANUVIA) 100 MG tablet Use as directed 14 tablet 0    SITagliptin (JANUVIA) 100 MG tablet Take 1 tablet by mouth daily 28 tablet 0    Insulin Pen Needle ([facility] UNIFINE PENTIPS PLUS) 32G X 4 MM MISC TEST AS DIRECTED DAILY 100 each 3    glimepiride (AMARYL) 2 MG tablet TAKE 1 TABLET BY MOUTH TWICE DAILY 60 tablet 11    budesonide (PULMICORT) 0.5 MG/2ML nebulizer suspension Take 2 mLs by nebulization 2 times daily Panch 60 ampule 11    ipratropium-albuterol (DUONEB) 0.5-2.5 (3) MG/3ML SOLN nebulizer solution Inhale 3 mLs into the lungs every 6 hours As directed by Dr hSelton Mariee.  120 mL 11    SITagliptin (JANUVIA) 50 MG tablet Take 1 tablet by mouth daily 30 tablet 3    ammonium lactate (LAC-HYDRIN) 12 % lotion Apply topically daily Apply topically as needed.  furosemide (LASIX) 40 MG tablet Take 40 mg by mouth See Admin Instructions 1 tab 2-3 times weekly, may increase to 1 tab daily   Calixto      Magnesium 500 MG TABS Take  by mouth.  aspirin 325 MG tablet Take 325 mg by mouth daily.  hydrochlorothiazide (HYDRODIURIL) 25 MG tablet Take 25 mg by mouth daily. [facility]      metoprolol (LOPRESSOR) 100 MG tablet Take 50 mg by mouth 2 times daily. 1/2 tab twice daily       simvastatin (ZOCOR) 80 MG tablet Take 80 mg by mouth nightly. [facility]       No current facility-administered medications for this visit. Objective    Vitals:    04/04/19 0816   BP: 122/60   Pulse: 68   Resp: 16   Temp: 97.1 °F (36.2 °C)   TempSrc: Tympanic   SpO2: 96%   Weight: 228 lb (103.4 kg)   Height: 5' 9\" (1.753 m)       Physical Exam   Constitutional: He appears well-developed and well-nourished. HENT:   Head: Normocephalic and atraumatic. Right Ear: Tympanic membrane, external ear and ear canal normal.   Left Ear: Tympanic membrane, external ear and ear canal normal.   Nose: Nose normal.   Mouth/Throat: Uvula is midline, oropharynx is clear and moist and mucous membranes are normal.   Neck: Normal range of motion. Neck supple. Cardiovascular: Normal rate, regular rhythm and normal heart sounds. No murmur heard. Pulmonary/Chest: Effort normal and breath sounds normal. He has no wheezes. Lymphadenopathy:     He has no cervical adenopathy. Skin: Skin is warm and dry. No rash noted.      Lab Results   Component Value Date    LABA1C 10.0 (H) 03/01/2019     Lab Results   Component Value Date    CHOL 119 11/28/2018    CHOL 98 10/11/2018    CHOL 134 11/03/2017     Lab Results   Component Value Date    TRIG 83 11/28/2018    TRIG 81 10/11/2018    TRIG 121 11/03/2017     Lab Results   Component Value Date    HDL 40 11/28/2018    HDL 33 (A) 10/11/2018    HDL 37 (L) 11/03/2017     Lab Results   Component Value Date    LDLCALC 62 11/28/2018    LDLCALC 49 10/11/2018    LDLCALC 73 11/03/2017     Lab Results   Component Value Date    LABVLDL 16 10/11/2018    VLDL 24 10/11/2016     Lab Results   Component Value Date    CHOLHDLRATIO 3.0 10/11/2018    CHOLHDLRATIO 3.4 10/11/2016    CHOLHDLRATIO 3.2 10/05/2012       Assessment & Plan    Diagnosis Orders   1. Type 2 diabetes mellitus without complication, without long-term current use of insulin ([facility]     2. Chronic obstructive pulmonary disease, unspecified COPD type ([facility]     3. Congestive heart failure, unspecified HF chronicity, unspecified heart failure type ([facility]     4. Lymphoproliferative disorder ([facility]     5. Asthma-COPD overlap syndrome (HCC)       BP and lipids controlled so continue same. He will f/u with Dr. Benigno Dykes for his Diabetes. Tried to  him to quit smoking. He has been told and is aware of dangers of smoking. He will f/u with his specialists as directed. Dr Benigno Dykes has been increasing his insulin    No orders of the defined types were placed in this encounter. No orders of the defined types were placed in this encounter. There are no discontinued medications. No follow-ups on file.     Nicolasa Burdick MD

## 2019-05-03 ENCOUNTER — OFFICE VISIT (OUTPATIENT)
Dept: PULMONOLOGY | Age: 74
End: 2019-05-03
Payer: MEDICARE

## 2019-05-03 VITALS
DIASTOLIC BLOOD PRESSURE: 64 MMHG | SYSTOLIC BLOOD PRESSURE: 132 MMHG | HEART RATE: 96 BPM | TEMPERATURE: 98.3 F | RESPIRATION RATE: 16 BRPM | WEIGHT: 233 LBS | HEIGHT: 69 IN | OXYGEN SATURATION: 94 % | BODY MASS INDEX: 34.51 KG/M2

## 2019-05-03 DIAGNOSIS — Z72.0 TOBACCO ABUSE: ICD-10-CM

## 2019-05-03 DIAGNOSIS — J44.9 ASTHMA-COPD OVERLAP SYNDROME (HCC): Primary | ICD-10-CM

## 2019-05-03 DIAGNOSIS — E66.9 OBESITY (BMI 30-39.9): ICD-10-CM

## 2019-05-03 PROCEDURE — G8926 SPIRO NO PERF OR DOC: HCPCS | Performed by: INTERNAL MEDICINE

## 2019-05-03 PROCEDURE — 3023F SPIROM DOC REV: CPT | Performed by: INTERNAL MEDICINE

## 2019-05-03 PROCEDURE — 4040F PNEUMOC VAC/ADMIN/RCVD: CPT | Performed by: INTERNAL MEDICINE

## 2019-05-03 PROCEDURE — 99214 OFFICE O/P EST MOD 30 MIN: CPT | Performed by: INTERNAL MEDICINE

## 2019-05-03 PROCEDURE — 1123F ACP DISCUSS/DSCN MKR DOCD: CPT | Performed by: INTERNAL MEDICINE

## 2019-05-03 PROCEDURE — G8417 CALC BMI ABV UP PARAM F/U: HCPCS | Performed by: INTERNAL MEDICINE

## 2019-05-03 PROCEDURE — 4004F PT TOBACCO SCREEN RCVD TLK: CPT | Performed by: INTERNAL MEDICINE

## 2019-05-03 PROCEDURE — G8427 DOCREV CUR MEDS BY ELIG CLIN: HCPCS | Performed by: INTERNAL MEDICINE

## 2019-05-03 PROCEDURE — G8598 ASA/ANTIPLAT THER USED: HCPCS | Performed by: INTERNAL MEDICINE

## 2019-05-03 ASSESSMENT — ENCOUNTER SYMPTOMS
SORE THROAT: 0
DIARRHEA: 0
SHORTNESS OF BREATH: 1
CHEST TIGHTNESS: 0
NAUSEA: 0
ABDOMINAL PAIN: 0
VOICE CHANGE: 0
COUGH: 0
VOMITING: 0
EYE ITCHING: 0
WHEEZING: 0
RHINORRHEA: 1

## 2019-05-03 NOTE — PROGRESS NOTES
Subjective:     Joe Dempsey is a 66 y.o. male who complains today of:     Chief Complaint   Patient presents with    Follow-up     f/u for COPD and Asthma. HPI  Patient is using budesonide and duoneb. He said he is using duoneb QID and budesonide 1-2 times a day only  Smoking 1/2 to 1 ppd  C/o shortness of breath , worse with exertion. No Wheezing   No Cough or  Sputum  No Hemoptysis  No Chest tightness   No Chest pain with radiation  or pleuritic pain  No Fever or chills. Still c/o  Rhinorrhea and postnasal drip just in morning . Allergies:  Patient has no known allergies. Past Medical History:   Diagnosis Date    BPH (benign prostatic hyperplasia)     Dr. Olegario Alfredo CAD (coronary artery disease)     status post CABG,    [facility]congest[facility] failure) [facility])     Chronic venous insufficiency     COPD (chronic obstructive pulmonary disease) ([facility])     Edema of extremities 9/30/2013    Elevated PSA     Enlarged prostate     Hyperlipidemia     Hypertension     Hypoxemia     uses supplemental oxygen at night.  Lymphoproliferative disorder ([address] 2013    had previous full work up. Dr. Ashok Cook. High WBC and low PLT    Macular degeneration, right eye     Obesity (BMI 30.0-34. 9)     Osteoarthritis of both knees     Pneumonia     Stasis dermatitis     uses lachyrdrin    Tobacco abuse     Type II diabetes mellitus, uncontrolled ([address]     Varicose veins     status post stripping procedure.       Past Surgical History:   Procedure Laterality Date    CARDIAC SURGERY      CORONARY ANGIOPLASTY WITH STENT PLACEMENT      VARICOSE VEIN SURGERY      VASECTOMY       Family History   Problem Relation Age of Onset    Diabetes Father     Heart Disease Father     Heart Disease Mother      Social History     Socioeconomic History    Marital status:      Spouse name: Not on file    Number of children: 3    Years of education: Not on file    Highest education level: Not on file Occupational History    Occupation: retired     Employer: [employer]     Comment: worked with chemicals, 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 Also worked for [employer]. Social Needs    Financial resource strain: Not on file    Food insecurity:     Worry: Not on file     Inability: Not on file    Transportation needs:     Medical: Not on file     Non-medical: Not on file   Tobacco Use    Smoking status: Current Every Day Smoker     Packs/day: 0.30     Years: 46.00     Pack years: 13.80     Types: Cigarettes    Smokeless tobacco: Never Used    Tobacco comment: pt aware of danger   Substance and Sexual Activity    Alcohol use: No     Comment: drinks an occasional beer every 3 months.  Drug use: No    Sexual activity: Yes     Partners: Female   Lifestyle    Physical activity:     Days per week: Not on file     Minutes per session: Not on file    Stress: Not on file   Relationships    Social connections:     Talks on phone: Not on file     Gets together: Not on file     Attends Denominational service: Not on file     Active member of club or organization: Not on file     Attends meetings of clubs or organizations: Not on file     Relationship status: Not on file    Intimate partner violence:     Fear of current or ex partner: Not on file     Emotionally abused: Not on file     Physically abused: Not on file     Forced sexual activity: Not on file   Other Topics Concern    Not on file   Social History Narrative    Not on file         Review of Systems   Constitutional: Negative for chills, diaphoresis, fatigue and fever. HENT: Positive for postnasal drip and rhinorrhea. Negative for congestion, mouth sores, nosebleeds, sneezing, sore throat and voice change. Eyes: Negative for itching and visual disturbance. Respiratory: Positive for shortness of breath. Negative for cough, chest tightness and wheezing. Cardiovascular: Negative. Negative for chest pain, palpitations and leg swelling. Gastrointestinal: Negative for abdominal pain, diarrhea, nausea and vomiting. Genitourinary: Negative for difficulty urinating and hematuria. Musculoskeletal: Negative for arthralgias, joint swelling and myalgias. Skin: Negative for rash. Allergic/Immunologic: Negative for environmental allergies. Neurological: Negative for dizziness, tremors, weakness and headaches. Psychiatric/Behavioral: Negative for behavioral problems and sleep disturbance. Objective:     Vitals:    05/03/19 0915   BP: 132/64   Pulse: 96   Resp: 16   Temp: 98.3 °F (36.8 °C)   TempSrc: Tympanic   SpO2: 94%   Weight: 233 lb (105.7 kg)   Height: 5' 9\" (1.753 m)         Physical Exam   Constitutional: He is oriented to person, place, and time. He appears well-developed and well-nourished. HENT:   Head: Normocephalic and atraumatic. Nose: Nose normal.   Mouth/Throat: Oropharynx is clear and moist.   Eyes: Pupils are equal, round, and reactive to light. Conjunctivae and EOM are normal.   Neck: No JVD present. No tracheal deviation present. No thyromegaly present. Cardiovascular: Normal rate and regular rhythm. Exam reveals no gallop and no friction rub. Murmur: sm.  Pulmonary/Chest: Effort normal and breath sounds normal. No respiratory distress. He has no wheezes. He has no rales. He exhibits no tenderness. diminished Breath sound bilaterally. Abdominal: He exhibits no distension. Musculoskeletal: Normal range of motion. He exhibits edema (1+ both leg). Lymphadenopathy:     He has no cervical adenopathy. Neurological: He is alert and oriented to person, place, and time. No cranial nerve deficit. Skin: Skin is warm and dry. No rash noted. Psychiatric: He has a normal mood and affect.  His behavior is normal.       Current Outpatient Medications   Medication Sig Dispense Refill    insulin glargine (LANTUS SOLOSTAR) 100 UNIT/ML injection pen 45 units at bedtime 10 pen 3    lisinopril (PRINIVIL;ZESTRIL) 5 MG tablet Take 1 tablet by mouth daily 30 tablet 3    linagliptin (TRADJENTA) 5 MG tablet Use as directed 28 tablet 0    SITagliptin (JANUVIA) 100 MG tablet Use as directed 42 tablet 0    SITagliptin (JANUVIA) 100 MG tablet Use as directed 14 tablet 0    SITagliptin (JANUVIA) 100 MG tablet Take 1 tablet by mouth daily 28 tablet 0    Insulin Pen Needle ([employer] UNIFINE PENTIPS PLUS) 32G X 4 MM MISC TEST AS DIRECTED DAILY 100 each 3    glimepiride (AMARYL) 2 MG tablet TAKE 1 TABLET BY MOUTH TWICE DAILY 60 tablet 11    budesonide (PULMICORT) 0.5 MG/2ML nebulizer suspension Take 2 mLs by nebulization 2 times daily Panch 60 ampule 11    ipratropium-albuterol (DUONEB) 0.5-2.5 (3) MG/3ML SOLN nebulizer solution Inhale 3 mLs into the lungs every 6 hours As directed by Dr Kash Conteh. 120 mL 11    SITagliptin (JANUVIA) 50 MG tablet Take 1 tablet by mouth daily 30 tablet 3    ammonium lactate (LAC-HYDRIN) 12 % lotion Apply topically daily Apply topically as needed.  furosemide (LASIX) 40 MG tablet Take 40 mg by mouth See Admin Instructions 1 tab 2-3 times weekly, may increase to 1 tab daily   Calixto      Magnesium 500 MG TABS Take  by mouth.  aspirin 325 MG tablet Take 325 mg by mouth daily.  hydrochlorothiazide (HYDRODIURIL) 25 MG tablet Take 25 mg by mouth daily. [address]      metoprolol (LOPRESSOR) 100 MG tablet Take 50 mg by mouth 2 times daily. 1/2 tab twice daily       simvastatin (ZOCOR) 80 MG tablet Take 80 mg by mouth nightly. [address] [facility]       No current facility-administered medications for this visit. Results for orders placed during the hospital encounter of 10/11/18   XR CHEST STANDARD (2 VW)    Narrative EXAMINATION: CHEST TWO VIEWS     CLINICAL HISTORY: J44.9 Asthma-COPD overlap syndrome ([facility] ICD10    COMPARISONS: November 1, 2017     FINDINGS:    Two views of the chest are submitted. There are multiple median sternotomy wires. The cardiac silhouette is borderline enlarged.  Unchanged The mediastinum is unremarkable. Pulmonary vascular is attenuated, lungs are hyperinflated and there is some widening of the AP diameter the chest and coarsening of the interstitium. Right sided trachea. Areas of atelectasis left lower lobe  No focal infiltrates. No effusions. Pneumothoraces. Impression NO ACUTE ACTIVE CARDIOPULMONARY PROCESS. RADIOGRAPHIC FINDINGS  OF COPD.     ]  ][facility], [address] [facility]                               PULMONARY FUNCTION     PATIENT NAME: Selina Galvan                :             1945  MED REC NO:   [id number]                           ROOM:  ACCOUNT NO:   [de-identified]                          ADMISSION DATE:  2017  PROVIDER:     Chuckie Corrales MD     DATE OF PROCEDURE:  2017     PFTs were done on this 33-year-old gentleman who is 5 feet 9 inches,  weighs 225 pounds with 50-year smoking history, presenting with  dyspnea.     Spirometry showed a forced vital capacity of 2.8 L which is 68% of  predicted. FEV1 was 1.58 L which is 53% of predicted. FEV1/FVC ratio  was significantly reduced to 56%. FEF 25-75% was severely reduced to  0.65 L per second which is 30% of predicted. Both FEV1 and terminal  airflow improved significantly after bronchodilators. MVV was  severely reduced.     Lung volumes done by body plethysmography showed a total lung capacity  to 6.16 L which is 90% of predicted. Residual volume was 3.48 L which  is 139% of predicted. RV/TLC ratio was increased to 56%.    Diffusion capacity was mildly reduced to 14.06 which is 73% of  predicted.     Airway resistance was increased.   Specific airway conductance was  reduced.     OVERALL IMPRESSION:  This study shows evidence of moderate-to-severe  obstructive ventilatory impairment associated with mild overinflation  and mild diffusion abnormality

## 2019-06-04 LAB
ANION GAP SERPL CALCULATED.3IONS-SCNC: 15 MEQ/L (ref 9–15)
BUN BLDV-MCNC: 22 MG/DL (ref 8–23)
CALCIUM SERPL-MCNC: 9.5 MG/DL (ref 8.5–9.9)
CHLORIDE BLD-SCNC: 99 MEQ/L (ref 95–107)
CO2: 27 MEQ/L (ref 20–31)
CREAT SERPL-MCNC: 0.87 MG/DL (ref 0.7–1.2)
GFR AFRICAN AMERICAN: >60
GFR NON-AFRICAN AMERICAN: >60
GLUCOSE BLD-MCNC: 226 MG/DL (ref 70–99)
HBA1C MFR BLD: 9.5 % (ref 4.8–5.9)
POTASSIUM SERPL-SCNC: 4 MEQ/L (ref 3.4–4.9)
SODIUM BLD-SCNC: 141 MEQ/L (ref 135–144)

## 2019-06-06 LAB
CREATININE URINE: 34.7 MG/DL
MICROALBUMIN UR-MCNC: 2.5 MG/DL
MICROALBUMIN/CREAT UR-RTO: 72 MG/G (ref 0–30)

## 2019-06-11 ENCOUNTER — OFFICE VISIT (OUTPATIENT)
Dept: ENDOCRINOLOGY | Age: 74
End: 2019-06-11
Payer: MEDICARE

## 2019-06-11 VITALS
DIASTOLIC BLOOD PRESSURE: 69 MMHG | BODY MASS INDEX: 34.66 KG/M2 | WEIGHT: 234 LBS | HEART RATE: 68 BPM | HEIGHT: 69 IN | SYSTOLIC BLOOD PRESSURE: 105 MMHG

## 2019-06-11 LAB
CHP ED QC CHECK: NORMAL
GLUCOSE BLD-MCNC: 187 MG/DL

## 2019-06-11 PROCEDURE — G8427 DOCREV CUR MEDS BY ELIG CLIN: HCPCS | Performed by: INTERNAL MEDICINE

## 2019-06-11 PROCEDURE — 1123F ACP DISCUSS/DSCN MKR DOCD: CPT | Performed by: INTERNAL MEDICINE

## 2019-06-11 PROCEDURE — 4040F PNEUMOC VAC/ADMIN/RCVD: CPT | Performed by: INTERNAL MEDICINE

## 2019-06-11 PROCEDURE — 82962 GLUCOSE BLOOD TEST: CPT | Performed by: INTERNAL MEDICINE

## 2019-06-11 PROCEDURE — G8417 CALC BMI ABV UP PARAM F/U: HCPCS | Performed by: INTERNAL MEDICINE

## 2019-06-11 PROCEDURE — G8598 ASA/ANTIPLAT THER USED: HCPCS | Performed by: INTERNAL MEDICINE

## 2019-06-11 PROCEDURE — 99213 OFFICE O/P EST LOW 20 MIN: CPT | Performed by: INTERNAL MEDICINE

## 2019-06-11 PROCEDURE — 4004F PT TOBACCO SCREEN RCVD TLK: CPT | Performed by: INTERNAL MEDICINE

## 2019-06-11 RX ORDER — LISINOPRIL 5 MG/1
5 TABLET ORAL DAILY
Qty: 30 TABLET | Refills: 11 | Status: SHIPPED | OUTPATIENT
Start: 2019-06-11 | End: 2020-04-13 | Stop reason: SDUPTHER

## 2019-06-11 ASSESSMENT — ENCOUNTER SYMPTOMS
SHORTNESS OF BREATH: 1
WHEEZING: 1

## 2019-06-11 NOTE — PROGRESS NOTES
Subjective:      Patient ID: Parish Boyle is a 66 y.o. male. 3 months follow-up on type 2 diabetes  Diabetes   He presents for his follow-up diabetic visit. He has type 2 diabetes mellitus. Diabetic complications include heart disease and nephropathy. Risk factors for coronary artery disease include tobacco exposure. Current diabetic treatment includes insulin injections (Lantus glimepiride and Januvia). He is currently taking insulin at bedtime. His overall blood glucose range is >200 mg/dl. (Lab Results       Component                Value               Date                       LABA1C                   9.5 (H)             06/04/2019              ) An ACE inhibitor/angiotensin II receptor blocker is being taken. Reviewed labs    Results for Marty Olivier (MRN [id number]) as of 6/11/2019 10:33   Ref.  Range 6/4/2019 11:06 6/6/2019 14:06   Sodium Latest Ref Range: 135 - 144 mEq/L 141    Potassium Latest Ref Range: 3.4 - 4.9 mEq/L 4.0    Chloride Latest Ref Range: 95 - 107 mEq/L 99    CO2 Latest Ref Range: 20 - 31 mEq/L 27    BUN Latest Ref Range: 8 - 23 mg/dL 22    Creatinine Latest Ref Range: 0.70 - 1.20 mg/dL 0.87    Anion Gap Latest Ref Range: 9 - 15 mEq/L 15    GFR Non- Latest Ref Range: >60  >60.0    GFR African American Latest Ref Range: >60  >60.0    Glucose Latest Ref Range: 70 - 99 mg/dL 226 (H)    Calcium Latest Ref Range: 8.5 - 9.9 mg/dL 9.5    Hemoglobin A1C Latest Ref Range: 4.8 - 5.9 % 9.5 (H)    Creatinine, Ur Latest Ref Range: Not Established mg/dL  34.7   Microalbumin Creatinine Ratio Latest Ref Range: 0.0 - 30.0 mg/G  72.0 (H)   Microalbumin, Random Urine Latest Ref Range: Not Established mg/dL  2.50 (H)         Patient Active Problem List   Diagnosis    Diabetes mellitus, type 2 (HCC)    Coronary artery disease    COPD (chronic obstructive pulmonary disease) (HCC)    Hypertension    Edema of extremities    Essential hypertension, benign    Osteoarthritis of knee    Tobacco abuse    BPH (benign prostatic hyperplasia)    Hyperlipidemia    Obesity (BMI 30.0-34. 9)    Stasis dermatitis of both legs    CHF (congestive heart failure) (HCC)    Lymphoproliferative disorder (HCC)    Asthma-COPD overlap syndrome (HCC)    Laceration of right lower extremity     No Known Allergies      Current Outpatient Medications:     insulin glargine (LANTUS SOLOSTAR) 100 UNIT/ML injection pen, 45 units at bedtime, Disp: 10 pen, Rfl: 3    lisinopril (PRINIVIL;ZESTRIL) 5 MG tablet, Take 1 tablet by mouth daily, Disp: 30 tablet, Rfl: 3    linagliptin (TRADJENTA) 5 MG tablet, Use as directed, Disp: 28 tablet, Rfl: 0    SITagliptin (JANUVIA) 100 MG tablet, Use as directed, Disp: 42 tablet, Rfl: 0    SITagliptin (JANUVIA) 100 MG tablet, Use as directed, Disp: 14 tablet, Rfl: 0    SITagliptin (JANUVIA) 100 MG tablet, Take 1 tablet by mouth daily, Disp: 28 tablet, Rfl: 0    Insulin Pen Needle ([employer] UNIFINE PENTIPS PLUS) 32G X 4 MM MISC, TEST AS DIRECTED DAILY, Disp: 100 each, Rfl: 3    glimepiride (AMARYL) 2 MG tablet, TAKE 1 TABLET BY MOUTH TWICE DAILY, Disp: 60 tablet, Rfl: 11    budesonide (PULMICORT) 0.5 MG/2ML nebulizer suspension, Take 2 mLs by nebulization 2 times daily [facility], Disp: 60 ampule, Rfl: 11    ipratropium-albuterol (DUONEB) 0.5-2.5 (3) MG/3ML SOLN nebulizer solution, Inhale 3 mLs into the lungs every 6 hours As directed by Dr Wei Juarez., Disp: 120 mL, Rfl: 11    SITagliptin (JANUVIA) 50 MG tablet, Take 1 tablet by mouth daily, Disp: 30 tablet, Rfl: 3    ammonium lactate (LAC-HYDRIN) 12 % lotion, Apply topically daily Apply topically as needed. , Disp: , Rfl:     furosemide (LASIX) 40 MG tablet, Take 40 mg by mouth See Admin Instructions 1 tab 2-3 times weekly, may increase to 1 tab daily  Calixto, Disp: , Rfl:     Magnesium 500 MG TABS, Take  by mouth., Disp: , Rfl:     aspirin 325 MG tablet, Take 325 mg by mouth daily.   , Disp: , Rfl:    hydrochlorothiazide (HYDRODIURIL) 25 MG tablet, Take 25 mg by mouth daily. [facility], Disp: , Rfl:     metoprolol (LOPRESSOR) 100 MG tablet, Take 50 mg by mouth 2 times daily. 1/2 tab twice daily , Disp: , Rfl:     simvastatin (ZOCOR) 80 MG tablet, Take 80 mg by mouth nightly. [facility], Disp: , Rfl:       Review of Systems   Respiratory: Positive for shortness of breath and wheezing. All other systems reviewed and are negative. Vitals:    19 1017   BP: 105/69   Pulse: 68   Weight: 234 lb (106.1 kg)   Height: 5' 9\" (1.753 m)       Objective:   Physical Exam   Constitutional: He appears well-developed and well-nourished. HENT:   Head: Normocephalic and atraumatic. Neck: Neck supple. Cardiovascular: Normal rate. Pulmonary/Chest: He has wheezes. Musculoskeletal: He exhibits edema. Skin: Skin is warm. Assessment:       Diagnosis Orders   1.  Uncontrolled type 2 diabetes mellitus with complication, with long-term current use of insulin ([facility])  POCT Glucose           Plan:        Orders Placed This Encounter   Procedures    Basic Metabolic Panel     Standing Status:   Future     Standing Expiration Date:   2020    Hemoglobin A1C     Standing Status:   Future     Standing Expiration Date:   2020    Microalbumin / Creatinine Urine Ratio     Standing Status:   Future     Standing Expiration Date:   2020    POCT Glucose     Orders Placed This Encounter   Medications    lisinopril (PRINIVIL;ZESTRIL) 5 MG tablet     Sig: Take 1 tablet by mouth daily     Dispense:  30 tablet     Refill:  11    insulin glargine (LANTUS SOLOSTAR) 100 UNIT/ML injection pen     Si units at bedtime     Dispense:  10 pen     Refill:  3     Continue januvia 50 mg daily plus Amaryl 2 mg bid       BENNIE Duncan MD

## 2019-08-15 ENCOUNTER — OFFICE VISIT (OUTPATIENT)
Dept: SURGERY | Age: 74
End: 2019-08-15
Payer: MEDICARE

## 2019-08-15 VITALS
SYSTOLIC BLOOD PRESSURE: 118 MMHG | TEMPERATURE: 98.2 F | BODY MASS INDEX: 34.56 KG/M2 | DIASTOLIC BLOOD PRESSURE: 72 MMHG | WEIGHT: 228 LBS | HEIGHT: 68 IN

## 2019-08-15 DIAGNOSIS — I87.2 STASIS DERMATITIS OF BOTH LEGS: Primary | ICD-10-CM

## 2019-08-15 DIAGNOSIS — R23.8 NONTRAUMATIC BLISTER OF SKIN: ICD-10-CM

## 2019-08-15 PROCEDURE — G8427 DOCREV CUR MEDS BY ELIG CLIN: HCPCS | Performed by: SURGERY

## 2019-08-15 PROCEDURE — 4004F PT TOBACCO SCREEN RCVD TLK: CPT | Performed by: SURGERY

## 2019-08-15 PROCEDURE — 4040F PNEUMOC VAC/ADMIN/RCVD: CPT | Performed by: SURGERY

## 2019-08-15 PROCEDURE — 99213 OFFICE O/P EST LOW 20 MIN: CPT | Performed by: SURGERY

## 2019-08-15 PROCEDURE — G8598 ASA/ANTIPLAT THER USED: HCPCS | Performed by: SURGERY

## 2019-08-15 PROCEDURE — 1123F ACP DISCUSS/DSCN MKR DOCD: CPT | Performed by: SURGERY

## 2019-08-15 PROCEDURE — G8417 CALC BMI ABV UP PARAM F/U: HCPCS | Performed by: SURGERY

## 2019-08-15 ASSESSMENT — ENCOUNTER SYMPTOMS
ALLERGIC/IMMUNOLOGIC NEGATIVE: 1
BLOOD IN STOOL: 0
COLOR CHANGE: 1
ABDOMINAL DISTENTION: 0
RHINORRHEA: 0
CONSTIPATION: 0
ABDOMINAL PAIN: 0
CHEST TIGHTNESS: 0
SHORTNESS OF BREATH: 1

## 2019-08-29 ENCOUNTER — OFFICE VISIT (OUTPATIENT)
Dept: SURGERY | Age: 74
End: 2019-08-29
Payer: MEDICARE

## 2019-08-29 VITALS
DIASTOLIC BLOOD PRESSURE: 82 MMHG | HEIGHT: 69 IN | WEIGHT: 224.8 LBS | BODY MASS INDEX: 33.3 KG/M2 | TEMPERATURE: 97.5 F | SYSTOLIC BLOOD PRESSURE: 126 MMHG

## 2019-08-29 DIAGNOSIS — R23.8 NONTRAUMATIC BLISTER OF SKIN: ICD-10-CM

## 2019-08-29 DIAGNOSIS — N40.0 BENIGN PROSTATIC HYPERPLASIA WITHOUT LOWER URINARY TRACT SYMPTOMS: ICD-10-CM

## 2019-08-29 DIAGNOSIS — I87.2 STASIS DERMATITIS OF BOTH LEGS: Primary | ICD-10-CM

## 2019-08-29 LAB
ANION GAP SERPL CALCULATED.3IONS-SCNC: 15 MEQ/L (ref 9–15)
BUN BLDV-MCNC: 20 MG/DL (ref 8–23)
CALCIUM SERPL-MCNC: 9.5 MG/DL (ref 8.5–9.9)
CHLORIDE BLD-SCNC: 94 MEQ/L (ref 95–107)
CO2: 31 MEQ/L (ref 20–31)
CREAT SERPL-MCNC: 1.04 MG/DL (ref 0.7–1.2)
GFR AFRICAN AMERICAN: >60
GFR NON-AFRICAN AMERICAN: >60
GLUCOSE BLD-MCNC: 158 MG/DL (ref 70–99)
HBA1C MFR BLD: 9.9 % (ref 4.8–5.9)
POTASSIUM SERPL-SCNC: 4 MEQ/L (ref 3.4–4.9)
PROSTATE SPECIFIC ANTIGEN: 1.64 NG/ML (ref 0–6.22)
SODIUM BLD-SCNC: 140 MEQ/L (ref 135–144)

## 2019-08-29 PROCEDURE — G8427 DOCREV CUR MEDS BY ELIG CLIN: HCPCS | Performed by: SURGERY

## 2019-08-29 PROCEDURE — 4004F PT TOBACCO SCREEN RCVD TLK: CPT | Performed by: SURGERY

## 2019-08-29 PROCEDURE — G8598 ASA/ANTIPLAT THER USED: HCPCS | Performed by: SURGERY

## 2019-08-29 PROCEDURE — 99213 OFFICE O/P EST LOW 20 MIN: CPT | Performed by: SURGERY

## 2019-08-29 PROCEDURE — 4040F PNEUMOC VAC/ADMIN/RCVD: CPT | Performed by: SURGERY

## 2019-08-29 PROCEDURE — G8417 CALC BMI ABV UP PARAM F/U: HCPCS | Performed by: SURGERY

## 2019-08-29 PROCEDURE — 1123F ACP DISCUSS/DSCN MKR DOCD: CPT | Performed by: SURGERY

## 2019-08-29 ASSESSMENT — ENCOUNTER SYMPTOMS
ALLERGIC/IMMUNOLOGIC NEGATIVE: 1
SHORTNESS OF BREATH: 1
CONSTIPATION: 0
COLOR CHANGE: 1
CHEST TIGHTNESS: 0
RHINORRHEA: 0
BLOOD IN STOOL: 0
ABDOMINAL DISTENTION: 0
ABDOMINAL PAIN: 0

## 2019-08-29 NOTE — PROGRESS NOTES
(Temporal)   Ht 5' 9\" (1.753 m)   Wt 224 lb 12.8 oz (102 kg)   BMI 33.20 kg/m²   Assessment:      Right leg wound, non-traumatic, healed      Plan:       Return to see me as needed  I have recommended wearing a garment or sock to protect that area at all times. He seems reluctant to want to do that.         Donna Romero MD

## 2019-09-04 LAB
CREATININE URINE: 82.2 MG/DL
MICROALBUMIN UR-MCNC: 7.2 MG/DL
MICROALBUMIN/CREAT UR-RTO: 87.6 MG/G (ref 0–30)

## 2019-09-11 ENCOUNTER — OFFICE VISIT (OUTPATIENT)
Dept: ENDOCRINOLOGY | Age: 74
End: 2019-09-11
Payer: MEDICARE

## 2019-09-11 VITALS
BODY MASS INDEX: 33.56 KG/M2 | HEIGHT: 69 IN | OXYGEN SATURATION: 95 % | WEIGHT: 226.6 LBS | SYSTOLIC BLOOD PRESSURE: 104 MMHG | HEART RATE: 86 BPM | DIASTOLIC BLOOD PRESSURE: 50 MMHG

## 2019-09-11 LAB
CHP ED QC CHECK: NORMAL
GLUCOSE BLD-MCNC: 173 MG/DL

## 2019-09-11 PROCEDURE — 82962 GLUCOSE BLOOD TEST: CPT | Performed by: INTERNAL MEDICINE

## 2019-09-11 PROCEDURE — G8428 CUR MEDS NOT DOCUMENT: HCPCS | Performed by: INTERNAL MEDICINE

## 2019-09-11 PROCEDURE — G8417 CALC BMI ABV UP PARAM F/U: HCPCS | Performed by: INTERNAL MEDICINE

## 2019-09-11 PROCEDURE — 4040F PNEUMOC VAC/ADMIN/RCVD: CPT | Performed by: INTERNAL MEDICINE

## 2019-09-11 PROCEDURE — 99213 OFFICE O/P EST LOW 20 MIN: CPT | Performed by: INTERNAL MEDICINE

## 2019-09-11 PROCEDURE — 1123F ACP DISCUSS/DSCN MKR DOCD: CPT | Performed by: INTERNAL MEDICINE

## 2019-09-11 PROCEDURE — 4004F PT TOBACCO SCREEN RCVD TLK: CPT | Performed by: INTERNAL MEDICINE

## 2019-09-11 PROCEDURE — G8598 ASA/ANTIPLAT THER USED: HCPCS | Performed by: INTERNAL MEDICINE

## 2019-09-11 RX ORDER — GLIMEPIRIDE 2 MG/1
TABLET ORAL
Qty: 90 TABLET | Refills: 3 | Status: SHIPPED | OUTPATIENT
Start: 2019-09-11 | End: 2019-12-11 | Stop reason: SDUPTHER

## 2019-09-15 ASSESSMENT — ENCOUNTER SYMPTOMS: COLOR CHANGE: 1

## 2019-09-23 ENCOUNTER — OFFICE VISIT (OUTPATIENT)
Dept: UROLOGY | Age: 74
End: 2019-09-23
Payer: MEDICARE

## 2019-09-23 VITALS
DIASTOLIC BLOOD PRESSURE: 60 MMHG | SYSTOLIC BLOOD PRESSURE: 112 MMHG | BODY MASS INDEX: 33.62 KG/M2 | WEIGHT: 227 LBS | HEART RATE: 69 BPM | HEIGHT: 69 IN

## 2019-09-23 DIAGNOSIS — N40.0 BENIGN PROSTATIC HYPERPLASIA WITHOUT LOWER URINARY TRACT SYMPTOMS: Primary | ICD-10-CM

## 2019-09-23 PROCEDURE — G8427 DOCREV CUR MEDS BY ELIG CLIN: HCPCS | Performed by: UROLOGY

## 2019-09-23 PROCEDURE — 1123F ACP DISCUSS/DSCN MKR DOCD: CPT | Performed by: UROLOGY

## 2019-09-23 PROCEDURE — 4040F PNEUMOC VAC/ADMIN/RCVD: CPT | Performed by: UROLOGY

## 2019-09-23 PROCEDURE — G8417 CALC BMI ABV UP PARAM F/U: HCPCS | Performed by: UROLOGY

## 2019-09-23 PROCEDURE — G8598 ASA/ANTIPLAT THER USED: HCPCS | Performed by: UROLOGY

## 2019-09-23 PROCEDURE — 99212 OFFICE O/P EST SF 10 MIN: CPT | Performed by: UROLOGY

## 2019-09-23 PROCEDURE — 4004F PT TOBACCO SCREEN RCVD TLK: CPT | Performed by: UROLOGY

## 2019-10-04 ENCOUNTER — OFFICE VISIT (OUTPATIENT)
Dept: FAMILY MEDICINE CLINIC | Age: 74
End: 2019-10-04
Payer: MEDICARE

## 2019-10-04 VITALS
OXYGEN SATURATION: 94 % | HEIGHT: 69 IN | HEART RATE: 67 BPM | DIASTOLIC BLOOD PRESSURE: 82 MMHG | WEIGHT: 227.8 LBS | SYSTOLIC BLOOD PRESSURE: 128 MMHG | BODY MASS INDEX: 33.74 KG/M2

## 2019-10-04 DIAGNOSIS — J44.9 ASTHMA-COPD OVERLAP SYNDROME (HCC): ICD-10-CM

## 2019-10-04 DIAGNOSIS — Z72.0 TOBACCO ABUSE: ICD-10-CM

## 2019-10-04 DIAGNOSIS — E11.9 TYPE 2 DIABETES MELLITUS WITHOUT COMPLICATION, WITHOUT LONG-TERM CURRENT USE OF INSULIN (HCC): ICD-10-CM

## 2019-10-04 DIAGNOSIS — E78.2 MIXED HYPERLIPIDEMIA: ICD-10-CM

## 2019-10-04 DIAGNOSIS — D47.9 LYMPHOPROLIFERATIVE DISORDER (HCC): ICD-10-CM

## 2019-10-04 DIAGNOSIS — I50.9 CONGESTIVE HEART FAILURE, UNSPECIFIED HF CHRONICITY, UNSPECIFIED HEART FAILURE TYPE (HCC): ICD-10-CM

## 2019-10-04 DIAGNOSIS — Z23 NEEDS FLU SHOT: ICD-10-CM

## 2019-10-04 DIAGNOSIS — I10 ESSENTIAL HYPERTENSION: Primary | ICD-10-CM

## 2019-10-04 DIAGNOSIS — J44.9 CHRONIC OBSTRUCTIVE PULMONARY DISEASE, UNSPECIFIED COPD TYPE (HCC): ICD-10-CM

## 2019-10-04 PROCEDURE — G8598 ASA/ANTIPLAT THER USED: HCPCS | Performed by: FAMILY MEDICINE

## 2019-10-04 PROCEDURE — 90653 IIV ADJUVANT VACCINE IM: CPT | Performed by: FAMILY MEDICINE

## 2019-10-04 PROCEDURE — 4040F PNEUMOC VAC/ADMIN/RCVD: CPT | Performed by: FAMILY MEDICINE

## 2019-10-04 PROCEDURE — G8510 SCR DEP NEG, NO PLAN REQD: HCPCS | Performed by: FAMILY MEDICINE

## 2019-10-04 PROCEDURE — 3023F SPIROM DOC REV: CPT | Performed by: FAMILY MEDICINE

## 2019-10-04 PROCEDURE — G8482 FLU IMMUNIZE ORDER/ADMIN: HCPCS | Performed by: FAMILY MEDICINE

## 2019-10-04 PROCEDURE — 1123F ACP DISCUSS/DSCN MKR DOCD: CPT | Performed by: FAMILY MEDICINE

## 2019-10-04 PROCEDURE — 3288F FALL RISK ASSESSMENT DOCD: CPT | Performed by: FAMILY MEDICINE

## 2019-10-04 PROCEDURE — G8926 SPIRO NO PERF OR DOC: HCPCS | Performed by: FAMILY MEDICINE

## 2019-10-04 PROCEDURE — G8417 CALC BMI ABV UP PARAM F/U: HCPCS | Performed by: FAMILY MEDICINE

## 2019-10-04 PROCEDURE — G8427 DOCREV CUR MEDS BY ELIG CLIN: HCPCS | Performed by: FAMILY MEDICINE

## 2019-10-04 PROCEDURE — G0008 ADMIN INFLUENZA VIRUS VAC: HCPCS | Performed by: FAMILY MEDICINE

## 2019-10-04 PROCEDURE — 4004F PT TOBACCO SCREEN RCVD TLK: CPT | Performed by: FAMILY MEDICINE

## 2019-10-04 PROCEDURE — 99214 OFFICE O/P EST MOD 30 MIN: CPT | Performed by: FAMILY MEDICINE

## 2019-10-04 ASSESSMENT — PATIENT HEALTH QUESTIONNAIRE - PHQ9
SUM OF ALL RESPONSES TO PHQ9 QUESTIONS 1 & 2: 0
2. FEELING DOWN, DEPRESSED OR HOPELESS: 0
1. LITTLE INTEREST OR PLEASURE IN DOING THINGS: 0
SUM OF ALL RESPONSES TO PHQ QUESTIONS 1-9: 0
SUM OF ALL RESPONSES TO PHQ QUESTIONS 1-9: 0

## 2019-10-04 ASSESSMENT — ENCOUNTER SYMPTOMS
ABDOMINAL PAIN: 0
SHORTNESS OF BREATH: 0

## 2019-11-04 ENCOUNTER — OFFICE VISIT (OUTPATIENT)
Dept: PULMONOLOGY | Age: 74
End: 2019-11-04
Payer: MEDICARE

## 2019-11-04 VITALS
BODY MASS INDEX: 33.18 KG/M2 | HEIGHT: 69 IN | RESPIRATION RATE: 16 BRPM | WEIGHT: 224 LBS | OXYGEN SATURATION: 96 % | HEART RATE: 72 BPM | SYSTOLIC BLOOD PRESSURE: 120 MMHG | DIASTOLIC BLOOD PRESSURE: 64 MMHG | TEMPERATURE: 96.5 F

## 2019-11-04 DIAGNOSIS — E66.9 OBESITY (BMI 30-39.9): ICD-10-CM

## 2019-11-04 DIAGNOSIS — Z72.0 TOBACCO ABUSE: ICD-10-CM

## 2019-11-04 DIAGNOSIS — J44.9 ASTHMA-COPD OVERLAP SYNDROME (HCC): Primary | ICD-10-CM

## 2019-11-04 PROCEDURE — G8926 SPIRO NO PERF OR DOC: HCPCS | Performed by: INTERNAL MEDICINE

## 2019-11-04 PROCEDURE — 1123F ACP DISCUSS/DSCN MKR DOCD: CPT | Performed by: INTERNAL MEDICINE

## 2019-11-04 PROCEDURE — 99214 OFFICE O/P EST MOD 30 MIN: CPT | Performed by: INTERNAL MEDICINE

## 2019-11-04 PROCEDURE — 4004F PT TOBACCO SCREEN RCVD TLK: CPT | Performed by: INTERNAL MEDICINE

## 2019-11-04 PROCEDURE — G8417 CALC BMI ABV UP PARAM F/U: HCPCS | Performed by: INTERNAL MEDICINE

## 2019-11-04 PROCEDURE — G8427 DOCREV CUR MEDS BY ELIG CLIN: HCPCS | Performed by: INTERNAL MEDICINE

## 2019-11-04 PROCEDURE — G8598 ASA/ANTIPLAT THER USED: HCPCS | Performed by: INTERNAL MEDICINE

## 2019-11-04 PROCEDURE — 4040F PNEUMOC VAC/ADMIN/RCVD: CPT | Performed by: INTERNAL MEDICINE

## 2019-11-04 PROCEDURE — 3023F SPIROM DOC REV: CPT | Performed by: INTERNAL MEDICINE

## 2019-11-04 PROCEDURE — G8482 FLU IMMUNIZE ORDER/ADMIN: HCPCS | Performed by: INTERNAL MEDICINE

## 2019-11-04 ASSESSMENT — ENCOUNTER SYMPTOMS
SORE THROAT: 0
NAUSEA: 0
RHINORRHEA: 0
SHORTNESS OF BREATH: 1
EYE ITCHING: 0
DIARRHEA: 0
WHEEZING: 1
VOICE CHANGE: 0
ABDOMINAL PAIN: 0
VOMITING: 0
CHEST TIGHTNESS: 0
COUGH: 0

## 2019-11-05 ENCOUNTER — TELEPHONE (OUTPATIENT)
Dept: FAMILY MEDICINE CLINIC | Age: 74
End: 2019-11-05

## 2019-12-09 LAB
ANION GAP SERPL CALCULATED.3IONS-SCNC: 14 MEQ/L (ref 9–15)
BUN BLDV-MCNC: 34 MG/DL (ref 8–23)
CALCIUM SERPL-MCNC: 9.1 MG/DL (ref 8.5–9.9)
CHLORIDE BLD-SCNC: 94 MEQ/L (ref 95–107)
CO2: 28 MEQ/L (ref 20–31)
CREAT SERPL-MCNC: 0.95 MG/DL (ref 0.7–1.2)
GFR AFRICAN AMERICAN: >60
GFR NON-AFRICAN AMERICAN: >60
GLUCOSE BLD-MCNC: 208 MG/DL (ref 70–99)
HBA1C MFR BLD: 10.4 % (ref 4.8–5.9)
POTASSIUM SERPL-SCNC: 4.1 MEQ/L (ref 3.4–4.9)
SODIUM BLD-SCNC: 136 MEQ/L (ref 135–144)

## 2019-12-11 ENCOUNTER — OFFICE VISIT (OUTPATIENT)
Dept: ENDOCRINOLOGY | Age: 74
End: 2019-12-11
Payer: MEDICARE

## 2019-12-11 VITALS
HEART RATE: 64 BPM | HEIGHT: 69 IN | BODY MASS INDEX: 32.7 KG/M2 | WEIGHT: 220.8 LBS | OXYGEN SATURATION: 93 % | SYSTOLIC BLOOD PRESSURE: 126 MMHG | DIASTOLIC BLOOD PRESSURE: 62 MMHG | RESPIRATION RATE: 18 BRPM

## 2019-12-11 LAB
CHP ED QC CHECK: NORMAL
GLUCOSE BLD-MCNC: 218 MG/DL

## 2019-12-11 PROCEDURE — G8482 FLU IMMUNIZE ORDER/ADMIN: HCPCS | Performed by: INTERNAL MEDICINE

## 2019-12-11 PROCEDURE — 4040F PNEUMOC VAC/ADMIN/RCVD: CPT | Performed by: INTERNAL MEDICINE

## 2019-12-11 PROCEDURE — G8417 CALC BMI ABV UP PARAM F/U: HCPCS | Performed by: INTERNAL MEDICINE

## 2019-12-11 PROCEDURE — G8427 DOCREV CUR MEDS BY ELIG CLIN: HCPCS | Performed by: INTERNAL MEDICINE

## 2019-12-11 PROCEDURE — 4004F PT TOBACCO SCREEN RCVD TLK: CPT | Performed by: INTERNAL MEDICINE

## 2019-12-11 PROCEDURE — 99213 OFFICE O/P EST LOW 20 MIN: CPT | Performed by: INTERNAL MEDICINE

## 2019-12-11 PROCEDURE — G8598 ASA/ANTIPLAT THER USED: HCPCS | Performed by: INTERNAL MEDICINE

## 2019-12-11 PROCEDURE — 82962 GLUCOSE BLOOD TEST: CPT | Performed by: INTERNAL MEDICINE

## 2019-12-11 PROCEDURE — 1123F ACP DISCUSS/DSCN MKR DOCD: CPT | Performed by: INTERNAL MEDICINE

## 2019-12-11 RX ORDER — GLIMEPIRIDE 2 MG/1
TABLET ORAL
Qty: 90 TABLET | Refills: 3 | Status: SHIPPED | OUTPATIENT
Start: 2019-12-11 | End: 2020-04-13 | Stop reason: SDUPTHER

## 2020-04-06 ENCOUNTER — VIRTUAL VISIT (OUTPATIENT)
Dept: FAMILY MEDICINE CLINIC | Age: 75
End: 2020-04-06
Payer: MEDICARE

## 2020-04-06 VITALS
SYSTOLIC BLOOD PRESSURE: 139 MMHG | OXYGEN SATURATION: 91 % | HEIGHT: 68 IN | WEIGHT: 245 LBS | DIASTOLIC BLOOD PRESSURE: 57 MMHG | BODY MASS INDEX: 37.13 KG/M2

## 2020-04-06 PROCEDURE — G2012 BRIEF CHECK IN BY MD/QHP: HCPCS | Performed by: FAMILY MEDICINE

## 2020-04-06 ASSESSMENT — PATIENT HEALTH QUESTIONNAIRE - PHQ9
1. LITTLE INTEREST OR PLEASURE IN DOING THINGS: 0
SUM OF ALL RESPONSES TO PHQ9 QUESTIONS 1 & 2: 0
SUM OF ALL RESPONSES TO PHQ QUESTIONS 1-9: 0
2. FEELING DOWN, DEPRESSED OR HOPELESS: 0
SUM OF ALL RESPONSES TO PHQ QUESTIONS 1-9: 0

## 2020-04-06 NOTE — PROGRESS NOTES
2020    TELEHEALTH EVALUATION -- Audio/Visual (During [facility] public health emergency)    Due to Judi 19 outbreak, patient's office visit was converted to a virtual visit. Patient was contacted and agreed to proceed with a virtual visit via Telephone Visit  The risks and benefits of converting to a virtual visit were discussed in light of the current infectious disease epidemic. Patient also understood that insurance coverage and co-pays are up to their individual insurance plans. HPI:    Miguel Angel Kaufman (:  1945) has requested an audio/video evaluation for the following concern(s):  Chief Complaint   Patient presents with    6 Month Follow-Up    Hypertension    COPD    Health Maintenance     AWV due     6 month follow up of bp, htn, copd    Home bp usually [id number] systolic  bottome number runs a little low    Good days/bad days like everybody else    Patient Active Problem List   Diagnosis    Diabetes mellitus, type 2 [facility]    Coronary artery disease    COPD (chronic obstructive pulmonary disease) ([facility]    Hypertension    Edema of extremities    Essential hypertension, benign    Osteoarthritis of knee    Tobacco abuse    BPH (benign prostatic hyperplasia)    Hyperlipidemia    Obesity (BMI 30.0-34. 9)    Stasis dermatitis of both legs    CHF (congestive heart failure) (HCC)    Lymphoproliferative disorder (HCC)    Asthma-COPD overlap syndrome (HCC)    Laceration of right lower extremity    Nontraumatic blister of skin    Obesity (BMI 30-39. 9)     Past Medical History:   Diagnosis Date    BPH (benign prostatic hyperplasia)     Dr. Nellene Schirmer CAD (coronary artery disease)     status post CABG,    CHF (congestive heart failure) (HCC)     Chronic venous insufficiency     COPD (chronic obstructive pulmonary disease) (HCC)     Edema of extremities 2013    Elevated PSA     Enlarged prostate     Hyperlipidemia     Hypertension     Hypoxemia     uses supplemental without long-term current use of insulin ([facility]     5. Chronic obstructive pulmonary disease, unspecified COPD type ([facility]     6. Mixed hyperlipidemia     7. Obesity (BMI 30-39.9)     8. Essential hypertension, benign       Chronic conditions are stable  Continue current regimen  Follow up with appropriate specialists and here routinely for ongoing monitoring of chronic conditions      No follow-ups on file. An  electronic signature was used to authenticate this note. --Francisca Soto MD on 4/6/2020 at 10:35 AM        Pursuant to the emergency declaration under the [address] [place] [address], [address] [place] [address] authority and the [employer] and [employer] Act, this Virtual  Visit was conducted, with patient's consent, to reduce the patient's risk of exposure to COVID-19 and provide continuity of care for an established patient.

## 2020-04-08 ENCOUNTER — TELEPHONE (OUTPATIENT)
Dept: PULMONOLOGY | Age: 75
End: 2020-04-08

## 2020-04-13 ENCOUNTER — VIRTUAL VISIT (OUTPATIENT)
Dept: ENDOCRINOLOGY | Age: 75
End: 2020-04-13
Payer: MEDICARE

## 2020-04-13 PROCEDURE — 99442 PR PHYS/QHP TELEPHONE EVALUATION 11-20 MIN: CPT | Performed by: INTERNAL MEDICINE

## 2020-04-13 RX ORDER — GLIMEPIRIDE 2 MG/1
TABLET ORAL
Qty: 90 TABLET | Refills: 3 | Status: SHIPPED | OUTPATIENT
Start: 2020-04-13 | End: 2020-09-14

## 2020-04-13 RX ORDER — LISINOPRIL 5 MG/1
5 TABLET ORAL DAILY
Qty: 30 TABLET | Refills: 11 | Status: SHIPPED | OUTPATIENT
Start: 2020-04-13 | End: 2021-05-03

## 2020-04-13 ASSESSMENT — ENCOUNTER SYMPTOMS: SHORTNESS OF BREATH: 1

## 2020-06-04 ENCOUNTER — TELEPHONE (OUTPATIENT)
Dept: PRIMARY CARE CLINIC | Age: 75
End: 2020-06-04

## 2020-06-04 NOTE — TELEPHONE ENCOUNTER
Called and left message for patient to call to schedule annual wellness visit. Contact information provided.     Mercy Polanco, LUDA

## 2020-06-15 ENCOUNTER — HOSPITAL ENCOUNTER (OUTPATIENT)
Dept: GENERAL RADIOLOGY | Age: 75
Discharge: HOME | End: 2020-06-17
Payer: MEDICARE

## 2020-06-15 PROCEDURE — 71046 X-RAY EXAM CHEST 2 VIEWS: CPT

## 2020-06-16 ENCOUNTER — VIRTUAL VISIT (OUTPATIENT)
Dept: PULMONOLOGY | Age: 75
End: 2020-06-16

## 2020-06-16 ASSESSMENT — ENCOUNTER SYMPTOMS
ABDOMINAL PAIN: 0
SHORTNESS OF BREATH: 1
SORE THROAT: 0
VOMITING: 0
COUGH: 1
EYE ITCHING: 0
DIARRHEA: 0
RHINORRHEA: 0
NAUSEA: 0
CHEST TIGHTNESS: 0
WHEEZING: 1
VOICE CHANGE: 0

## 2020-06-16 NOTE — PROGRESS NOTES
metoprolol (LOPRESSOR) 100 MG tablet Take 50 mg by mouth 2 times daily. 1/2 tab twice daily       simvastatin (ZOCOR) 80 MG tablet Take 80 mg by mouth nightly. [address] [facility]       No current facility-administered medications for this visit. Results for orders placed during the hospital encounter of 06/15/20   XR CHEST STANDARD (2 VW)    Narrative XR CHEST (2 VW): 6/15/2020    CLINICAL HISTORY: J44.9 Asthma-COPD overlap syndrome ([facility] [address] ICD10. COMPARISON: 10/11/2018. TECHNIQUE: Upright PA and lateral radiographs of the chest were obtained. FINDINGS:    Hyperinflation and coarsening of the bronchovascular structures consistent with COPD appears unchanged. The heart remains borderline enlarged, with postoperative changes from previous CABG again noted. There are no developing infiltrates, pleural effusion, vascular congestion, pneumothorax, or displaced fractures identified. Impression NO EVIDENCE OF ACTIVE CARDIOPULMONARY DISEASE. COPD, WHICH IS BEST EVALUATED CLINICALLY.           ]  No results found for this or any previous visit.]  No results found for this or any previous visit.]  No results found for this or any previous visit.]  Results for orders placed during the hospital encounter of 12/18/12   CT Chest W Contrast    Narrative EXAMINATION  CT SCAN OF THE CHEST, WITH CONTRAST      CLINICAL DATA  LYMPHADENOPATHY. COMPARISON  None. TECHNIQUE  Multiple serial axial images from the base of the neck  through the upper abdomen with coronal reconstructions, following the  intravenous administration of 100 cc of Optiray 320 was performed. FINDINGS     There are no focal parenchymal abnormalities. No pleural  effusions. No pneumothoraces. There is no significant para-aortic,  parahilar or subcarinal adenopathy   There are some scattered  pretracheal adenopathy noted. I see no significant axillary  adenopathy.   Within the field-of-view there is some partially  visualized,

## 2020-07-02 ENCOUNTER — TELEPHONE (OUTPATIENT)
Dept: ENDOCRINOLOGY | Age: 75
End: 2020-07-02

## 2020-07-13 LAB
ANION GAP SERPL CALCULATED.3IONS-SCNC: 12 MEQ/L (ref 9–15)
BUN BLDV-MCNC: 27 MG/DL (ref 8–23)
CALCIUM SERPL-MCNC: 9.5 MG/DL (ref 8.5–9.9)
CHLORIDE BLD-SCNC: 97 MEQ/L (ref 95–107)
CO2: 26 MEQ/L (ref 20–31)
CREAT SERPL-MCNC: 1.01 MG/DL (ref 0.7–1.2)
GFR AFRICAN AMERICAN: >60
GFR NON-AFRICAN AMERICAN: >60
GLUCOSE BLD-MCNC: 174 MG/DL (ref 70–99)
HBA1C MFR BLD: 10.4 % (ref 4.8–5.9)
POTASSIUM SERPL-SCNC: 3.8 MEQ/L (ref 3.4–4.9)
SODIUM BLD-SCNC: 135 MEQ/L (ref 135–144)

## 2020-07-15 ENCOUNTER — VIRTUAL VISIT (OUTPATIENT)
Dept: ENDOCRINOLOGY | Age: 75
End: 2020-07-15
Payer: MEDICARE

## 2020-07-15 PROCEDURE — 99442 PR PHYS/QHP TELEPHONE EVALUATION 11-20 MIN: CPT | Performed by: INTERNAL MEDICINE

## 2020-07-15 ASSESSMENT — ENCOUNTER SYMPTOMS
WHEEZING: 1
SHORTNESS OF BREATH: 1

## 2020-07-15 NOTE — PROGRESS NOTES
7/15/2020    TELEHEALTH EVALUATION -- Audio/Visual (During TNMCV-13 public health emergency)    Due to Judi 19 outbreak, patient's office visit was converted to a virtual visit. Patient was contacted and agreed to proceed with a virtual visit via Telephone Visit  The risks and benefits of converting to a virtual visit were discussed in light of the current infectious disease epidemic. Patient also understood that insurance coverage and co-pays are up to their individual insurance plans. HPI: Patient made aware this is a telephone visit billable visit agreed to proceed    William Sorensen (:  1945) has requested an audio/video evaluation for the following concern(s):    Follow-up on type 2 diabetes blood sugars have been around in the 200 range denies any hypoglycemia patient has been mostly staying home and has been less active than normal testing blood sugars once or twice a day currently on Lantus 60 units at bedtime Januvia 50 mg daily and glimepiride 2 mg twice daily      Results for Ketan Clarke (MRN [id number]) as of 7/15/2020 09:41   Ref. Range 2019 10:21 2019 10:46 6/15/2020 07:46 2020 11:23   Sodium Latest Ref Range: 135 - 144 mEq/L 136   135   Potassium Latest Ref Range: 3.4 - 4.9 mEq/L 4.1   3.8   Chloride Latest Ref Range: 95 - 107 mEq/L 94 (L)   97   CO2 Latest Ref Range: 20 - 31 mEq/L 28   26   BUN Latest Ref Range: 8 - 23 mg/dL 34 (H)   27 (H)   Creatinine Latest Ref Range: 0.70 - 1.20 mg/dL 0.95   1.01   Anion Gap Latest Ref Range: 9 - 15 mEq/L 14   12   GFR Non- Latest Ref Range: >60  >60.0   >60.0   GFR African American Latest Ref Range: >60  >60.0   >60.0   Glucose Latest Ref Range: 70 - 99 mg/dL 208 (H) 218  174 (H)   Calcium Latest Ref Range: 8.5 - 9.9 mg/dL 9.1   9.5   Hemoglobin A1C Latest Ref Range: 4.8 - 5.9 % 10.4 (H)   10.4 (H)       Review of Systems   Constitutional: Positive for fatigue.    Respiratory: Positive for shortness of breath Substance Use Topics    Alcohol use: No     Comment: drinks an occasional beer every 3 months.  Drug use: No            PHYSICAL EXAMINATION:  [ INSTRUCTIONS:  \"[x]\" Indicates a positive item  \"[]\" Indicates a negative item  -- DELETE ALL ITEMS NOT EXAMINED]  [] Alert  [] Oriented to person/place/time    [] No apparent distress  [] Toxic appearing    [] Face flushed appearing [] Sclera clear  [] Lips are cyanotic      [] Breathing appears normal  [] Appears tachypneic      [] Rash on visible skin    [] Cranial Nerves II-XII grossly intact    [] Motor grossly intact in visible upper extremities    [] Motor grossly intact in visible lower extremities    [] Normal Mood  [] Anxious appearing    [] Depressed appearing  [] Confused appearing      [] Poor short term memory  [] Poor long term memory    [] OTHER:      Due to this being a TeleHealth encounter, evaluation of the following organ systems is limited: Vitals/Constitutional/EENT/Resp/CV/GI//MS/Neuro/Skin/Heme-Lymph-Imm. ASSESSMENT/PLAN:     Diagnosis Orders   1. Type 2 diabetes mellitus with other specified complication, with long-term current use of insulin ([facility] [address]       Orders Placed This Encounter   Procedures    Basic Metabolic Panel     Standing Status:   Future     Standing Expiration Date:   7/15/2021    Hemoglobin A1C     Standing Status:   Future     Standing Expiration Date:   7/15/2021     Continue current dose of Lantus Januvia and glimepiride patient to have repeat labs in 3 months time A1c goal of less than 8    Total time spent with patient was 16 minutes    An  electronic signature was used to authenticate this note.     --Jennifer Zaldivar MD on 7/15/2020 at 9:40 AM        Pursuant to the emergency declaration under the [facility], [address] [place] [address] authority and the [employer] Act, this Virtual  Visit was conducted, with patient's consent, to reduce the patient's risk of exposure to COVID-19 and provide continuity of care for an established patient. Services were provided through a video synchronous discussion virtually to substitute for in-person clinic visit.

## 2020-08-26 RX ORDER — INSULIN GLARGINE 100 [IU]/ML
INJECTION, SOLUTION SUBCUTANEOUS
Qty: 10 PEN | Refills: 11 | Status: SHIPPED | OUTPATIENT
Start: 2020-08-26 | End: 2021-08-30

## 2020-09-14 RX ORDER — GLIMEPIRIDE 2 MG/1
TABLET ORAL
Qty: 90 TABLET | Refills: 3 | Status: SHIPPED | OUTPATIENT
Start: 2020-09-14 | End: 2020-10-15 | Stop reason: SDUPTHER

## 2020-09-15 ENCOUNTER — VIRTUAL VISIT (OUTPATIENT)
Dept: PULMONOLOGY | Age: 75
End: 2020-09-15
Payer: MEDICARE

## 2020-09-15 PROCEDURE — 1123F ACP DISCUSS/DSCN MKR DOCD: CPT | Performed by: INTERNAL MEDICINE

## 2020-09-15 PROCEDURE — 99214 OFFICE O/P EST MOD 30 MIN: CPT | Performed by: INTERNAL MEDICINE

## 2020-09-15 PROCEDURE — G8926 SPIRO NO PERF OR DOC: HCPCS | Performed by: INTERNAL MEDICINE

## 2020-09-15 PROCEDURE — G8417 CALC BMI ABV UP PARAM F/U: HCPCS | Performed by: INTERNAL MEDICINE

## 2020-09-15 PROCEDURE — 3023F SPIROM DOC REV: CPT | Performed by: INTERNAL MEDICINE

## 2020-09-15 PROCEDURE — G8428 CUR MEDS NOT DOCUMENT: HCPCS | Performed by: INTERNAL MEDICINE

## 2020-09-15 PROCEDURE — 4040F PNEUMOC VAC/ADMIN/RCVD: CPT | Performed by: INTERNAL MEDICINE

## 2020-09-15 PROCEDURE — 4004F PT TOBACCO SCREEN RCVD TLK: CPT | Performed by: INTERNAL MEDICINE

## 2020-09-15 ASSESSMENT — ENCOUNTER SYMPTOMS
NAUSEA: 0
EYE ITCHING: 0
SORE THROAT: 0
DIARRHEA: 0
RHINORRHEA: 0
SHORTNESS OF BREATH: 1
COUGH: 0
CHEST TIGHTNESS: 0
VOMITING: 0
WHEEZING: 1
VOICE CHANGE: 0
ABDOMINAL PAIN: 0

## 2020-09-15 NOTE — PROGRESS NOTES
Subjective:     Edith Hernandez is a 66 y.o. male who complains today of:     No chief complaint on file. HPI  He said he is using duoneb QID and budesonide 1-2 times a day only  C/o shortness of breath  with exertion. Occasional Wheezing   No Cough or  Sputum  No Hemoptysis  No Chest tightness   No Chest pain with radiation  or pleuritic pain  No Fever or chills. No Rhinorrhea and postnasal drip. He is smoking 1/2 ppd        Allergies:  Patient has no known allergies. Past Medical History:   Diagnosis Date    BPH (benign prostatic hyperplasia)     Dr. Joel Choi CAD (coronary artery disease)     status post CABG,    [facility]congestive [facility] failure[facility]     Chronic venous insufficiency     COPD (chronic obstructive pulmonary disease) ([facility])     Edema of extremities 9/30/2013    Elevated PSA     Enlarged prostate     Hyperlipidemia     Hypertension     Hypoxemia     uses supplemental oxygen at night.  Lymphoproliferative disorder ([facility] [address] 2013    had previous full work up. Dr. Daksha Teran. High WBC and low PLT    Macular degeneration, right eye     Obesity (BMI 30.0-34. 9)     Osteoarthritis of both knees     Pneumonia     Stasis dermatitis     uses lachyrdrin    Tobacco abuse     Type II diabetes mellitus, uncontrolled ([address]     Varicose veins     status post stripping procedure.       Past Surgical History:   Procedure Laterality Date    CARDIAC SURGERY      CORONARY ANGIOPLASTY WITH STENT PLACEMENT      VARICOSE VEIN SURGERY      VASECTOMY       Family History   Problem Relation Age of Onset    Diabetes Father     Heart Disease Father     Heart Disease Mother      Social History     Socioeconomic History    Marital status:      Spouse name: Not on file    Number of children: 3    Years of education: Not on file    Highest education level: Not on file   Occupational History    Occupation: retired     Employer: [employer] worked with and hematuria. Musculoskeletal: Negative for arthralgias, joint swelling and myalgias. Skin: Negative for rash. Allergic/Immunologic: Negative for environmental allergies. Neurological: Negative for dizziness, tremors, weakness and headaches. Psychiatric/Behavioral: Negative for behavioral problems and sleep disturbance.         :   There were no vitals filed for this visit. Wt Readings from Last 3 Encounters:   04/06/20 245 lb (111.1 kg)   12/11/19 220 lb 12.8 oz (100.2 kg)   11/04/19 224 lb (101.6 kg)         Physical Exam phone visit    Current Outpatient Medications   Medication Sig Dispense Refill    glimepiride (AMARYL) 2 MG tablet TAKE 1 TABLET BY MOUTH TWICE DAILY 90 tablet 3    insulin glargine (LANTUS SOLOSTAR) 100 UNIT/ML injection pen 60 units at bedtime 10 pen 11    SITagliptin (JANUVIA) 50 MG tablet Take 1 tablet by mouth daily 30 tablet 3    SITagliptin (JANUVIA) 100 MG tablet One daily 30 tablet 3    lisinopril (PRINIVIL;ZESTRIL) 5 MG tablet Take 1 tablet by mouth daily 30 tablet 11    Insulin Pen Needle ([employer] UNIFINE PENTIPS PLUS) 32G X 4 MM MISC TEST AS DIRECTED DAILY 100 each 3    budesonide (PULMICORT) 0.5 MG/2ML nebulizer suspension Take 2 mLs by nebulization 2 times daily Panch 60 ampule 11    ipratropium-albuterol (DUONEB) 0.5-2.5 (3) MG/3ML SOLN nebulizer solution Inhale 3 mLs into the lungs every 6 hours As directed by Dr Leyda Morales. 120 mL 11    ammonium lactate (LAC-HYDRIN) 12 % lotion Apply topically daily Apply topically as needed.  furosemide (LASIX) 40 MG tablet Take 40 mg by mouth See Admin Instructions 1 tab 2-3 times weekly, may increase to 1 tab daily   Calixto      Magnesium 500 MG TABS Take  by mouth.  aspirin 325 MG tablet Take 325 mg by mouth daily.  hydrochlorothiazide (HYDRODIURIL) 25 MG tablet Take 25 mg by mouth daily. [address] [facility]      metoprolol (LOPRESSOR) 100 MG tablet Take 50 mg by mouth 2 times daily.  1/2 tab twice daily       simvastatin (ZOCOR) 80 MG tablet Take 80 mg by mouth nightly. [address] [facility]       No current facility-administered medications for this visit. Results for orders placed during the hospital encounter of 06/15/20   XR CHEST STANDARD (2 VW)    Narrative XR CHEST (2 VW): 6/15/2020    CLINICAL HISTORY: J44.9 Asthma-COPD overlap syndrome ([facility] [address] ICD10. COMPARISON: 10/11/2018. TECHNIQUE: Upright PA and lateral radiographs of the chest were obtained. FINDINGS:    Hyperinflation and coarsening of the bronchovascular structures consistent with COPD appears unchanged. The heart remains borderline enlarged, with postoperative changes from previous CABG again noted. There are no developing infiltrates, pleural effusion, vascular congestion, pneumothorax, or displaced fractures identified. Impression NO EVIDENCE OF ACTIVE CARDIOPULMONARY DISEASE. COPD, WHICH IS BEST EVALUATED CLINICALLY. Results for orders placed during the hospital encounter of 12/18/12   CT Chest W Contrast    Narrative EXAMINATION  CT SCAN OF THE CHEST, WITH CONTRAST      CLINICAL DATA  LYMPHADENOPATHY. COMPARISON  None. TECHNIQUE  Multiple serial axial images from the base of the neck  through the upper abdomen with coronal reconstructions, following the  intravenous administration of 100 cc of Optiray 320 was performed. FINDINGS     There are no focal parenchymal abnormalities. No pleural  effusions. No pneumothoraces. There is no significant para-aortic,  parahilar or subcarinal adenopathy   There are some scattered  pretracheal adenopathy noted. I see no significant axillary  adenopathy. Within the field-of-view there is some partially  visualized,  subcentimeter supraclavicular nodes noted. Visualized osseous structures show multilevel degenerative change. IMPRESSION      1.  THERE IS A FEW PRETRACHEAL LYMPH NODES WITHIN THE MEDIASTINUM.        2.   THE REMAINDER OF THE CT SCAN OF THE CHEST IS OTHERWISE,  UNREMARKABLE. EXAMINATION  CT SCAN OF THE ABDOMEN AND PELVIS, WITH CONTRAST      CLINICAL DATA  SEE ABOVE. TECHNIQUE  Multiple serial axial images from the base of the lungs  through the pelvis with coronal reconstructions following the oral and  intravenous administration of contrast, as described above was  performed. FINDINGS   There is diffuse decrease in attenuation of the liver,  which is a nonspecific finding, which can be seen in fatty  infiltration. No other focal parenchyma abnormalities. No  intrahepatic biliary dilatation. The gallbladder, pancreas, and spleen  are unremarkable. There is some scattered calcifications seen within  the pancreas, which may be due to possibility of chronic pancreatitis. Correlate clinically. The adrenals and kidneys are unremarkable. Large and small bowel show no sign of obstruction. The appendix is  visualized. It is retrocecal and is partially air-filled. It measures  5 to 6 mm. No diverticulitis. No free air. No free fluid. The  visualized abdominal aorta is ectatic,  but of normal caliber. There  is no significant retroperitoneal, peripelvic or mesenteric adenopathy. There is some nonspecific bilateral inguinal adenopathy. The visualized osseous structures show multilevel degenerative changes  in the visualized lumbar spine. IMPRESSION      1.  HEPATIC STEATOSIS. 2.  THERE ARE SCATTERED PANCREATIC CALCIFICATIONS.  MAY JUST BE DUE TO  THE POSSIBILITY OF CHRONIC PANCREATITIS. CORRELATE WITH PATIENT  HISTORY. 3.  OTHER FINDINGS AS DETAILED ABOVE. 4.  THE REMAINDER OF THE CT SCAN OF THE ABDOMEN AND PELVIS IS  OTHERWISE, UNREMARKABLE. Serina Albarran By- Edilson North M.D. Released By- Edilson North M.D. Released Date Time- 12/18/12 8226   This document has been electronically signed. ------------------------------------------------------------------------------       Assessment/Plan:     1. Asthma-COPD overlap syndrome (HCC)  C/o shortness of breath  with exertion. Occasional Wheezing. No Cough or  Sputum. He said he is using duoneb QID and budesonide 1-2 times a day only. No need for refill. Continue bronchodilator therapy as before. 2. Tobacco abuse  Patient advised try to quit smoking. Risks related to smoking explained to the patient. Different ways to help him quit smoking were discussed today. He is smoking 1/2 PPD    3. Obesity (BMI 30-39. 9)  Patient patient is advised try to lose weight. obesity related risk explained to the patient , Suggested weight control approaches, including dietary changes , exercise, behavioral modification. Patient notified that this is a billable service and has given verbal consent for telehealth services. Time spent with patient 22  minutes. Return in about 3 months (around 12/15/2020) for COPD.       Cady Bingham MD

## 2020-10-15 ENCOUNTER — VIRTUAL VISIT (OUTPATIENT)
Dept: ENDOCRINOLOGY | Age: 75
End: 2020-10-15
Payer: MEDICARE

## 2020-10-15 PROBLEM — E66.01 MORBIDLY OBESE (HCC): Status: ACTIVE | Noted: 2020-10-15

## 2020-10-15 PROCEDURE — 99442 PR PHYS/QHP TELEPHONE EVALUATION 11-20 MIN: CPT | Performed by: INTERNAL MEDICINE

## 2020-10-15 RX ORDER — GLIMEPIRIDE 2 MG/1
TABLET ORAL
Qty: 180 TABLET | Refills: 3 | Status: SHIPPED | OUTPATIENT
Start: 2020-10-15 | End: 2021-12-15 | Stop reason: SDUPTHER

## 2020-10-15 RX ORDER — PEN NEEDLE, DIABETIC 32GX 5/32"
NEEDLE, DISPOSABLE MISCELLANEOUS
Qty: 100 EACH | Refills: 11 | Status: SHIPPED | OUTPATIENT
Start: 2020-10-15 | End: 2022-02-08 | Stop reason: SDUPTHER

## 2020-10-15 NOTE — PROGRESS NOTES
10/15/2020    TELEHEALTH EVALUATION -- Audio/Visual (During KBTTS-46 public health emergency)    Due to COVID 19 outbreak, patient's office visit was converted to a virtual visit. Patient was contacted and agreed to proceed with a virtual visit via Telephone Visit  The risks and benefits of converting to a virtual visit were discussed in light of the current infectious disease epidemic. Patient also understood that insurance coverage and co-pays are up to their individual insurance plans. HPI: Made aware telephone visit patient was at home    Benjamin Waterman (:  1945) has requested an audio/video evaluation for the following concern(s):    Type 2 diabetes on Lantus 60 units at bedtime glimepiride 2 mg twice a day Januvia 50 mg daily patient is testing blood sugar 1-2 times a day A1c's have been between 9-10 range had labs done through cardiology visit recently last A1c was 79.5    Complications of diabetes include heart disease  Patient has also received flu vaccination    Results for Christy Quezada (MRN [id number]) as of 10/15/2020 09:31   Ref.  Range 2020 11:23   Sodium Latest Ref Range: 135 - 144 mEq/L 135   Potassium Latest Ref Range: 3.4 - 4.9 mEq/L 3.8   Chloride Latest Ref Range: 95 - 107 mEq/L 97   CO2 Latest Ref Range: 20 - 31 mEq/L 26   BUN Latest Ref Range: 8 - 23 mg/dL 27 (H)   Creatinine Latest Ref Range: 0.70 - 1.20 mg/dL 1.01   Anion Gap Latest Ref Range: 9 - 15 mEq/L 12   GFR Non- Latest Ref Range: >60  >60.0   GFR African American Latest Ref Range: >60  >60.0   Glucose Latest Ref Range: 70 - 99 mg/dL 174 (H)   Calcium Latest Ref Range: 8.5 - 9.9 mg/dL 9.5   Hemoglobin A1C Latest Ref Range: 4.8 - 5.9 % 10.4 (H)     Patient Active Problem List   Diagnosis    Diabetes mellitus, type 2 (HCC)    Coronary artery disease    COPD (chronic obstructive pulmonary disease) (HCC)    Hypertension    Edema of extremities    Essential hypertension, benign    Osteoarthritis of knee    Tobacco abuse    BPH (benign prostatic hyperplasia)    Hyperlipidemia    Obesity (BMI 30.0-34. 9)    Stasis dermatitis of both legs    CHF (congestive heart failure) (HCC)    Lymphoproliferative disorder (HCC)    Asthma-COPD overlap syndrome (HCC)    Laceration of right lower extremity    Nontraumatic blister of skin    Obesity (BMI 30-39. 9)       Review of Systems    Prior to Visit Medications    Medication Sig Taking? Authorizing Provider   glimepiride (AMARYL) 2 MG tablet TAKE 1 TABLET BY MOUTH TWICE DAILY  Abdi Babin MD   insulin glargine (LANTUS SOLOSTAR) 100 UNIT/ML injection pen 60 units at bedtime  Abdi Babin MD   SITagliptin (JANUVIA) 50 MG tablet Take 1 tablet by mouth daily  Zane Walker MD   SITagliptin (JANUVIA) 100 MG tablet One daily  Zane Walker MD   lisinopril (PRINIVIL;ZESTRIL) 5 MG tablet Take 1 tablet by mouth daily  Abdi Babin MD   Insulin Pen Needle ([employer] UNIFINE PENTIPS PLUS) 32G X 4 MM MISC TEST AS DIRECTED DAILY  Abdi Babin MD   budesonide (PULMICORT) 0.5 MG/2ML nebulizer suspension Take 2 mLs by nebulization 2 times daily Abiel Pineda MD   ipratropium-albuterol (DUONEB) 0.5-2.5 (3) MG/3ML SOLN nebulizer solution Inhale 3 mLs into the lungs every 6 hours As directed by Dr Iliana Pool. Vanessa Posey MD   ammonium lactate (LAC-HYDRIN) 12 % lotion Apply topically daily Apply topically as needed. Historical Provider, MD   furosemide (LASIX) 40 MG tablet Take 40 mg by mouth See Admin Instructions 1 tab 2-3 times weekly, may increase to 1 tab daily   [address] [facility] [address] Provider, MD   Magnesium 500 MG TABS Take  by mouth. Historical Provider, MD   aspirin 325 MG tablet Take 325 mg by mouth daily. Historical Provider, MD   hydrochlorothiazide (HYDRODIURIL) 25 MG tablet Take 25 mg by mouth daily. [facility]  Historical Provider, MD   metoprolol (LOPRESSOR) 100 MG tablet Take 50 mg by mouth 2 times daily.  1/2 tab twice daily   Historical Provider,

## 2020-11-03 PROBLEM — I10 HYPERTENSION: Status: RESOLVED | Noted: 2020-11-03 | Resolved: 2020-11-03

## 2020-12-08 ENCOUNTER — TELEPHONE (OUTPATIENT)
Dept: FAMILY MEDICINE CLINIC | Age: 75
End: 2020-12-08

## 2020-12-14 ENCOUNTER — VIRTUAL VISIT (OUTPATIENT)
Dept: PULMONOLOGY | Age: 75
End: 2020-12-14
Payer: MEDICARE

## 2020-12-14 PROCEDURE — 99443 PR PHYS/QHP TELEPHONE EVALUATION 21-30 MIN: CPT | Performed by: INTERNAL MEDICINE

## 2020-12-14 ASSESSMENT — ENCOUNTER SYMPTOMS
COUGH: 0
ABDOMINAL PAIN: 0
CHEST TIGHTNESS: 0
SHORTNESS OF BREATH: 1
DIARRHEA: 0
VOICE CHANGE: 0
NAUSEA: 0
EYE ITCHING: 0
VOMITING: 0
RHINORRHEA: 0
SORE THROAT: 0
WHEEZING: 0

## 2020-12-14 NOTE — PROGRESS NOTES
Subjective:     Sylvain Fuller is a 66 y.o. male who complains today of:     Chief Complaint   Patient presents with    Follow-up     Sylvain Fuller is a 66 y.o. male evaluated via telephone on 12/14/2020. Consent:  He and/or health care decision maker is aware that that he may receive a bill for this telephone service, depending on his insurance coverage, and has provided verbal consent to proceed    HPI  C/o shortness of breath  with exertion. No  Wheezing   No Cough with  Sputum  No Hemoptysis  No Chest tightness   No Chest pain with radiation  or pleuritic pain  No Fever or chills. No Rhinorrhea and postnasal drip. He is using bronchodilator with duoneb QID and budesonide 1-2 times a day only    He is still smoking 1/2 ppd    Allergies:  Patient has no known allergies. Past Medical History:   Diagnosis Date    BPH (benign prostatic hyperplasia)     Dr. Zaid Juarez CAD (coronary artery disease)     status post CABG,    CHF [facility]congestive [facility] failure) [facility]     Chronic venous insufficiency     COPD (chronic obstructive pulmonary disease) ([facility])     Edema of extremities 9/30/2013    Elevated PSA     Enlarged prostate     Hyperlipidemia     Hypertension     Hypoxemia     uses supplemental oxygen at night.  Lymphoproliferative disorder ([address] 2013    had previous full work up. Dr. Philipp Garcia. High WBC and low PLT    Macular degeneration, right eye     Obesity (BMI 30.0-34. 9)     Osteoarthritis of both knees     Pneumonia     Stasis dermatitis     uses lachyrdrin    Tobacco abuse     Type II diabetes mellitus, uncontrolled ([address]     Varicose veins     status post stripping procedure.       Past Surgical History:   Procedure Laterality Date    CARDIAC SURGERY      CORONARY ANGIOPLASTY WITH STENT PLACEMENT      VARICOSE VEIN SURGERY      VASECTOMY       Family History   Problem Relation Age of Onset    Diabetes Father     Heart Disease Father     Heart Disease Mother Social History     Socioeconomic History    Marital status:      Spouse name: Not on file    Number of children: 3    Years of education: Not on file    Highest education level: Not on file   Occupational History    Occupation: retired     Employer: [employer]     Comment: worked with chemicals, 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 Also worked for [employer]. Social Needs    Financial resource strain: Not on file    Food insecurity     Worry: Not on file     Inability: Not on file    Transportation needs     Medical: Not on file     Non-medical: Not on file   Tobacco Use    Smoking status: Current Every Day Smoker     Packs/day: 0.30     Years: 46.00     Pack years: 13.80     Types: Cigarettes    Smokeless tobacco: Never Used    Tobacco comment: pt aware of danger   Substance and Sexual Activity    Alcohol use: No     Comment: drinks an occasional beer every 3 months.  Drug use: No    Sexual activity: Yes     Partners: Female   Lifestyle    Physical activity     Days per week: Not on file     Minutes per session: Not on file    Stress: Not on file   Relationships    Social connections     Talks on phone: Not on file     Gets together: Not on file     Attends Congregational service: Not on file     Active member of club or organization: Not on file     Attends meetings of clubs or organizations: Not on file     Relationship status: Not on file    Intimate partner violence     Fear of current or ex partner: Not on file     Emotionally abused: Not on file     Physically abused: Not on file     Forced sexual activity: Not on file   Other Topics Concern    Not on file   Social History Narrative    Not on file         Review of Systems   Constitutional: Negative for chills, diaphoresis, fatigue and fever. HENT: Negative for congestion, mouth sores, nosebleeds, postnasal drip, rhinorrhea, sneezing, sore throat and voice change. Eyes: Negative for itching and visual disturbance. Respiratory: Positive for shortness of breath. Negative for cough, chest tightness and wheezing. Cardiovascular: Negative. Negative for chest pain, palpitations and leg swelling. Gastrointestinal: Negative for abdominal pain, diarrhea, nausea and vomiting. Genitourinary: Negative for difficulty urinating and hematuria. Musculoskeletal: Negative for arthralgias, joint swelling and myalgias. Skin: Negative for rash. Allergic/Immunologic: Negative for environmental allergies. Neurological: Negative for dizziness, tremors, weakness and headaches. Psychiatric/Behavioral: Negative for behavioral problems and sleep disturbance.         :   There were no vitals filed for this visit. Wt Readings from Last 3 Encounters:   04/06/20 245 lb (111.1 kg)   12/11/19 220 lb 12.8 oz (100.2 kg)   11/04/19 224 lb (101.6 kg)         Physical Exam physical exam     Current Outpatient Medications   Medication Sig Dispense Refill    Insulin Pen Needle ([employer] UNIFINE PENTIPS PLUS) 32G X 4 MM MISC AS DIRECTED DAILY 100 each 11    SITagliptin (JANUVIA) 50 MG tablet Take 1 tablet by mouth daily 30 tablet 11    glimepiride (AMARYL) 2 MG tablet bid 180 tablet 3    insulin glargine (LANTUS SOLOSTAR) 100 UNIT/ML injection pen 60 units at bedtime 10 pen 11    SITagliptin (JANUVIA) 100 MG tablet One daily 30 tablet 3    lisinopril (PRINIVIL;ZESTRIL) 5 MG tablet Take 1 tablet by mouth daily 30 tablet 11    budesonide (PULMICORT) 0.5 MG/2ML nebulizer suspension Take 2 mLs by nebulization 2 times daily Panch 60 ampule 11    ipratropium-albuterol (DUONEB) 0.5-2.5 (3) MG/3ML SOLN nebulizer solution Inhale 3 mLs into the lungs every 6 hours As directed by Dr Gleda Castleman. 120 mL 11    ammonium lactate (LAC-HYDRIN) 12 % lotion Apply topically daily Apply topically as needed.       furosemide (LASIX) 40 MG tablet Take 40 mg by mouth See Admin Instructions 1 tab 2-3 times weekly, may increase to 1 tab daily   [employer]  Magnesium 500 MG TABS Take  by mouth.  aspirin 325 MG tablet Take 325 mg by mouth daily.  hydrochlorothiazide (HYDRODIURIL) 25 MG tablet Take 25 mg by mouth daily. [address] [facility]      metoprolol (LOPRESSOR) 100 MG tablet Take 50 mg by mouth 2 times daily. 1/2 tab twice daily       simvastatin (ZOCOR) 80 MG tablet Take 80 mg by mouth nightly. [facility]       No current facility-administered medications for this visit. Results for orders placed during the hospital encounter of 06/15/20   XR CHEST STANDARD (2 VW)    Narrative XR CHEST (2 VW): 6/15/2020    CLINICAL HISTORY: J44.9 Asthma-COPD overlap syndrome ([facility] ICD10. COMPARISON: 10/11/2018. TECHNIQUE: Upright PA and lateral radiographs of the chest were obtained. FINDINGS:    Hyperinflation and coarsening of the bronchovascular structures consistent with COPD appears unchanged. The heart remains borderline enlarged, with postoperative changes from previous CABG again noted. There are no developing infiltrates, pleural effusion, vascular congestion, pneumothorax, or displaced fractures identified. Impression NO EVIDENCE OF ACTIVE CARDIOPULMONARY DISEASE. COPD, WHICH IS BEST EVALUATED CLINICALLY. Results for orders placed during the hospital encounter of 12/18/12   CT Chest W Contrast    Narrative EXAMINATION  CT SCAN OF THE CHEST, WITH CONTRAST      CLINICAL DATA  LYMPHADENOPATHY. COMPARISON  None. TECHNIQUE  Multiple serial axial images from the base of the neck  through the upper abdomen with coronal reconstructions, following the  intravenous administration of 100 cc of Optiray 320 was performed. FINDINGS     There are no focal parenchymal abnormalities. No pleural  effusions. No pneumothoraces. There is no significant para-aortic,  parahilar or subcarinal adenopathy   There are some scattered  pretracheal adenopathy noted.   I see no significant axillary adenopathy. Within the field-of-view there is some partially  visualized,  subcentimeter supraclavicular nodes noted. Visualized osseous structures show multilevel degenerative change. IMPRESSION      1.  THERE IS A FEW PRETRACHEAL LYMPH NODES WITHIN THE MEDIASTINUM. 2.   THE REMAINDER OF THE CT SCAN OF THE CHEST IS OTHERWISE,  UNREMARKABLE. EXAMINATION  CT SCAN OF THE ABDOMEN AND PELVIS, WITH CONTRAST      CLINICAL DATA  SEE ABOVE. TECHNIQUE  Multiple serial axial images from the base of the lungs  through the pelvis with coronal reconstructions following the oral and  intravenous administration of contrast, as described above was  performed. FINDINGS   There is diffuse decrease in attenuation of the liver,  which is a nonspecific finding, which can be seen in fatty  infiltration. No other focal parenchyma abnormalities. No  intrahepatic biliary dilatation. The gallbladder, pancreas, and spleen  are unremarkable. There is some scattered calcifications seen within  the pancreas, which may be due to possibility of chronic pancreatitis. Correlate clinically. The adrenals and kidneys are unremarkable. Large and small bowel show no sign of obstruction. The appendix is  visualized. It is retrocecal and is partially air-filled. It measures  5 to 6 mm. No diverticulitis. No free air. No free fluid. The  visualized abdominal aorta is ectatic,  but of normal caliber. There  is no significant retroperitoneal, peripelvic or mesenteric adenopathy. There is some nonspecific bilateral inguinal adenopathy. The visualized osseous structures show multilevel degenerative changes  in the visualized lumbar spine. IMPRESSION      1.  HEPATIC STEATOSIS. 2.  THERE ARE SCATTERED PANCREATIC CALCIFICATIONS.  MAY JUST BE DUE TO  THE POSSIBILITY OF CHRONIC PANCREATITIS. CORRELATE WITH PATIENT  HISTORY. 3.  OTHER FINDINGS AS DETAILED ABOVE. 4.  THE REMAINDER OF THE CT SCAN OF THE ABDOMEN AND PELVIS IS  OTHERWISE, UNREMARKABLE. Adam Contreras By- Wilda Fagan M.D. Released By- Wilda Fagan M.D. Released Date Time- 12/18/12 2283   This document has been electronically signed. ------------------------------------------------------------------------------   ]    Assessment/Plan:     1. Asthma-COPD overlap syndrome (HCC)  C/o shortness of breath  with exertion. No  Wheezing . No Cough with  Sputum  He is using bronchodilator with duoneb QID and budesonide 1-2 times a day only. Continue bronchodilator as before. 2. Tobacco abuse  He is advised try to quit smoking. Risks related to smoking explained to the patient. Different ways to help to quit smoking were discussed today. He is still smoking 1/2 ppd    3. Obesity (BMI 30-39. 9)  He is advised try to lose weight. obesity related risk explained to the patient ,  Suggested weight control approaches, including dietary changes , exercise, behavioral modification. Patient notified that this is a billable service and has given verbal consent for telehealth services. Time spent with patient 22  minutes. Return in about 4 months (around 4/14/2021) for COPD.       Vanessa Posey MD

## 2021-01-15 ENCOUNTER — VIRTUAL VISIT (OUTPATIENT)
Dept: ENDOCRINOLOGY | Age: 76
End: 2021-01-15
Payer: MEDICARE

## 2021-01-15 PROCEDURE — 99442 PR PHYS/QHP TELEPHONE EVALUATION 11-20 MIN: CPT | Performed by: INTERNAL MEDICINE

## 2021-01-15 ASSESSMENT — ENCOUNTER SYMPTOMS
SHORTNESS OF BREATH: 1
WHEEZING: 1

## 2021-01-15 NOTE — PROGRESS NOTES
1/15/2021    TELEHEALTH EVALUATION -- Audio/Visual (During [id number] public health emergency)    Due to COVID 19 outbreak, patient's office visit was converted to a virtual visit. Patient was contacted and agreed to proceed with a virtual visit via Telephone Visit  The risks and benefits of converting to a virtual visit were discussed in light of the current infectious disease epidemic. Patient also understood that insurance coverage and co-pays are up to their individual insurance plans. HPI: Telephone visit patient was at home I was at my office    Bryan Rodriguez (:  1945) has requested an audio/video evaluation for the following concern(s):    Follow-up on type 2 diabetes patient has not had labs done in a years time but has been stable test blood sugar 1-2 times daily mostly in the morning A1c's have been more than 10 complication diabetes include heart disease chronic kidney disease currently on Lantus 60 units at bedtime Januvia 50 mg daily plus glimepiride 2 mg twice a day denies any hypoglycemia    Lab Results   Component Value Date     2020    K 3.8 2020    CL 97 2020    CO2 26 2020    BUN 27 (H) 2020    CREATININE 1.01 2020    GLUCOSE 174 (H) 2020    CALCIUM 9.5 2020    PROT 6.8 10/09/2019    LABALBU 4.0 10/09/2019    BILITOT 1.4 (H) 10/09/2019    ALKPHOS 168 (H) 10/09/2019    AST 19 10/09/2019    ALT 15 10/09/2019    LABGLOM >60.0 2020    GFRAA >60.0 2020    AGRATIO 1.4 10/11/2018    GLOB 2.3 2015       Lab Results   Component Value Date    LABA1C 10.4 (H) 2020       Patient Active Problem List   Diagnosis    Diabetes mellitus, type 2 ([address]    Coronary artery disease    Edema of extremities    Essential hypertension, benign    Osteoarthritis of knee    Tobacco abuse    BPH (benign prostatic hyperplasia)    Hyperlipidemia    Obesity (BMI 30.0-34. 9)    Stasis dermatitis of both legs  CHF (congestive heart failure) (HCC)    Lymphoproliferative disorder (HCC)    Asthma-COPD overlap syndrome (HCC)    Laceration of right lower extremity    Nontraumatic blister of skin    Obesity (BMI 30-39. 9)    Morbidly obese (HCC)     No Known Allergies      Review of Systems   Respiratory: Positive for shortness of breath and wheezing. All other systems reviewed and are negative. Prior to Visit Medications    Medication Sig Taking? Authorizing Provider   Insulin Pen Needle ([employer] UNIFINE PENTIPS PLUS) 32G X 4 MM MISC AS DIRECTED DAILY  Abdi Babin MD   SITagliptin (JANUVIA) 50 MG tablet Take 1 tablet by mouth daily  Td Medina MD   glimepiride (AMARYL) 2 MG tablet bid  Td Medina MD   insulin glargine (LANTUS SOLOSTAR) 100 UNIT/ML injection pen 60 units at bedtime  Abdi Babin MD   SITagliptin (JANUVIA) 100 MG tablet One daily  Td Medina MD   lisinopril (PRINIVIL;ZESTRIL) 5 MG tablet Take 1 tablet by mouth daily  Abdi Babin MD   budesonide (PULMICORT) 0.5 MG/2ML nebulizer suspension Take 2 mLs by nebulization 2 times daily Maria Teresa Boss MD   ipratropium-albuterol (DUONEB) 0.5-2.5 (3) MG/3ML SOLN nebulizer solution Inhale 3 mLs into the lungs every 6 hours As directed by Dr Namita Lopez. Alicia Hernandez MD   ammonium lactate (LAC-HYDRIN) 12 % lotion Apply topically daily Apply topically as needed. Historical Provider, MD   furosemide (LASIX) 40 MG tablet Take 40 mg by mouth See Admin Instructions 1 tab 2-3 times weekly, may increase to 1 tab daily   [facility] Provider, MD   Magnesium 500 MG TABS Take  by mouth. Historical Provider, MD   aspirin 325 MG tablet Take 325 mg by mouth daily. Historical Provider, MD   hydrochlorothiazide (HYDRODIURIL) 25 MG tablet Take 25 mg by mouth daily. [facility]  Historical Provider, MD   metoprolol (LOPRESSOR) 100 MG tablet Take 50 mg by mouth 2 times daily.  1/2 tab twice daily   Historical Provider, MD simvastatin (ZOCOR) 80 MG tablet Take 80 mg by mouth nightly. [facility]  Historical Provider, MD       Social History     Tobacco Use    Smoking status: Current Every Day Smoker     Packs/day: 0.30     Years: 46.00     Pack years: 13.80     Types: Cigarettes    Smokeless tobacco: Never Used    Tobacco comment: pt aware of danger   Substance Use Topics    Alcohol use: No     Comment: drinks an occasional beer every 3 months.  Drug use: No            PHYSICAL EXAMINATION:  [ INSTRUCTIONS:  \"[x]\" Indicates a positive item  \"[]\" Indicates a negative item  -- DELETE ALL ITEMS NOT EXAMINED]  [] Alert  [] Oriented to person/place/time    [] No apparent distress  [] Toxic appearing    [] Face flushed appearing [] Sclera clear  [] Lips are cyanotic      [] Breathing appears normal  [] Appears tachypneic      [] Rash on visible skin    [] Cranial Nerves II-XII grossly intact    [] Motor grossly intact in visible upper extremities    [] Motor grossly intact in visible lower extremities    [] Normal Mood  [] Anxious appearing    [] Depressed appearing  [] Confused appearing      [] Poor short term memory  [] Poor long term memory    [] OTHER:      Due to this being a TeleHealth encounter, evaluation of the following organ systems is limited: Vitals/Constitutional/EENT/Resp/CV/GI//MS/Neuro/Skin/Heme-Lymph-Imm. ASSESSMENT/PLAN:     Diagnosis Orders   1.  Uncontrolled type 2 diabetes mellitus with complication, with long-term current use of insulin (HCC)  Basic Metabolic Panel    Hemoglobin A1C     Orders Placed This Encounter   Procedures    Basic Metabolic Panel     Standing Status:   Future     Standing Expiration Date:   1/15/2022    Hemoglobin A1C     Standing Status:   Future     Standing Expiration Date:   1/15/2022     Continue current dose of Lantus glimepiride Januvia patient to have labs done in the next 2 to 3 months time follow-up in 3 months either physically or virtually A1c goal of less than 8 An  electronic signature was used to authenticate this note. --Dagoberto العراقي MD on 1/15/2021 at 10:21 AM        Pursuant to the emergency declaration under the [address] [place] [address] waiver authority and the [email or web address] and [employer] Act, this Virtual  Visit was conducted, with patient's consent, to reduce the patient's risk of exposure to COVID-19 and provide continuity of care for an established patient. Services were provided through a video synchronous discussion virtually to substitute for in-person clinic visit.

## 2021-04-12 ENCOUNTER — VIRTUAL VISIT (OUTPATIENT)
Dept: PULMONOLOGY | Age: 76
End: 2021-04-12
Payer: MEDICARE

## 2021-04-12 DIAGNOSIS — Z72.0 TOBACCO ABUSE: ICD-10-CM

## 2021-04-12 DIAGNOSIS — J44.9 ASTHMA-COPD OVERLAP SYNDROME (HCC): Primary | ICD-10-CM

## 2021-04-12 DIAGNOSIS — E66.9 OBESITY (BMI 30-39.9): ICD-10-CM

## 2021-04-12 PROCEDURE — 99443 PR PHYS/QHP TELEPHONE EVALUATION 21-30 MIN: CPT | Performed by: INTERNAL MEDICINE

## 2021-04-12 ASSESSMENT — ENCOUNTER SYMPTOMS
EYE ITCHING: 0
CHEST TIGHTNESS: 0
VOMITING: 0
WHEEZING: 0
SHORTNESS OF BREATH: 1
NAUSEA: 0
COUGH: 0
ABDOMINAL PAIN: 0
VOICE CHANGE: 0
SORE THROAT: 0
RHINORRHEA: 0
DIARRHEA: 0

## 2021-04-12 NOTE — PROGRESS NOTES
Subjective:     Pasty Oppenheim is a 64 y.o. male who complains today of:     Chief Complaint   Patient presents with    Follow-up     Patient and/or health care decision maker is aware that that he/she may receive a bill for this telephone service, depending on his insurance coverage, and has provided verbal consent to proceed. HPI  He is using bronchodilator with duoneb QID and budesonide 2 times a day. C/o shortness of breath  with exertion. No Wheezing   No Cough or Sputum  No Hemoptysis  No Chest tightness   No Chest pain with radiation  or pleuritic pain  No Fever or chills. No Rhinorrhea and postnasal drip. He is smoking 10 cigarette a day. Allergies:  Patient has no known allergies. Past Medical History:   Diagnosis Date    BPH (benign prostatic hyperplasia)     Dr. Drew Olivier CAD (coronary artery disease)     status post CABG,    [facility]congestive heart failure) [facility]     Chronic venous insufficiency     COPD [facility]chronic obstructive pulmonary disease) ([facility]     Edema of extremities 9/30/2013    Elevated PSA     Enlarged prostate     Hyperlipidemia     Hypertension     Hypoxemia     uses supplemental oxygen at night.  Lymphoproliferative disorder ([facility] [address] 2013    had previous full work up. Dr. Oleksandr Powers. High WBC and low PLT    Macular degeneration, right eye     Obesity (BMI 30.0-34. 9)     Osteoarthritis of both knees     Pneumonia     Stasis dermatitis     uses lachyrdrin    Tobacco abuse     Type II diabetes mellitus, uncontrolled ([address]     Varicose veins     status post stripping procedure.       Past Surgical History:   Procedure Laterality Date    CARDIAC SURGERY      CORONARY ANGIOPLASTY WITH STENT PLACEMENT      VARICOSE VEIN SURGERY      VASECTOMY       Family History   Problem Relation Age of Onset    Diabetes Father     Heart Disease Father     Heart Disease Mother      Social History     Socioeconomic History    Marital status:      Spouse name: Not on file    Number of children: 3    Years of education: Not on file    Highest education level: Not on file   Occupational History    Occupation: retired     Employer: [employer]     Comment: worked with chemicals, 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 Also worked for [employer]. Social Needs    Financial resource strain: Not on file    Food insecurity     Worry: Not on file     Inability: Not on file    Transportation needs     Medical: Not on file     Non-medical: Not on file   Tobacco Use    Smoking status: Current Every Day Smoker     Packs/day: 0.30     Years: 46.00     Pack years: 13.80     Types: Cigarettes    Smokeless tobacco: Never Used    Tobacco comment: pt aware of danger   Substance and Sexual Activity    Alcohol use: No     Comment: drinks an occasional beer every 3 months.  Drug use: No    Sexual activity: Yes     Partners: Female   Lifestyle    Physical activity     Days per week: Not on file     Minutes per session: Not on file    Stress: Not on file   Relationships    Social connections     Talks on phone: Not on file     Gets together: Not on file     Attends Congregational service: Not on file     Active member of club or organization: Not on file     Attends meetings of clubs or organizations: Not on file     Relationship status: Not on file    Intimate partner violence     Fear of current or ex partner: Not on file     Emotionally abused: Not on file     Physically abused: Not on file     Forced sexual activity: Not on file   Other Topics Concern    Not on file   Social History Narrative    Not on file         Review of Systems   Constitutional: Negative for chills, diaphoresis, fatigue and fever. HENT: Negative for congestion, mouth sores, nosebleeds, postnasal drip, rhinorrhea, sneezing, sore throat and voice change. Eyes: Negative for itching and visual disturbance. Respiratory: Positive for shortness of breath.  Negative for cough, chest tightness and wheezing. Cardiovascular: Negative. Negative for chest pain, palpitations and leg swelling. Gastrointestinal: Negative for abdominal pain, diarrhea, nausea and vomiting. Genitourinary: Negative for difficulty urinating and hematuria. Musculoskeletal: Negative for arthralgias, joint swelling and myalgias. Skin: Negative for rash. Allergic/Immunologic: Negative for environmental allergies. Neurological: Negative for dizziness, tremors, weakness and headaches. Psychiatric/Behavioral: Negative for behavioral problems and sleep disturbance.         :   There were no vitals filed for this visit. Wt Readings from Last 3 Encounters:   04/06/20 245 lb (111.1 kg)   12/11/19 220 lb 12.8 oz (100.2 kg)   11/04/19 224 lb (101.6 kg)         Physical Exam  Constitutional:       Appearance: He is well-developed. HENT:      Head: Normocephalic and atraumatic. Nose: Nose normal.   Eyes:      Conjunctiva/sclera: Conjunctivae normal.      Pupils: Pupils are equal, round, and reactive to light. Neck:      Thyroid: No thyromegaly. Vascular: No JVD. Trachea: No tracheal deviation. Cardiovascular:      Rate and Rhythm: Normal rate and regular rhythm. Heart sounds: No murmur. No friction rub. No gallop. Pulmonary:      Effort: Pulmonary effort is normal. No respiratory distress. Breath sounds: Normal breath sounds. No wheezing or rales. Chest:      Chest wall: No tenderness. Abdominal:      General: There is no distension. Musculoskeletal: Normal range of motion. Lymphadenopathy:      Cervical: No cervical adenopathy. Skin:     General: Skin is warm and dry. Findings: No rash. Neurological:      Mental Status: He is alert and oriented to person, place, and time. Cranial Nerves: No cranial nerve deficit.    Psychiatric:         Behavior: Behavior normal.         Current Outpatient Medications   Medication Sig Dispense Refill    Insulin Pen Needle ([facility] Merle Gauss pancreas, which may be due to possibility of chronic pancreatitis. Correlate clinically. The adrenals and kidneys are unremarkable. Large and small bowel show no sign of obstruction. The appendix is  visualized. It is retrocecal and is partially air-filled. It measures  5 to 6 mm. No diverticulitis. No free air. No free fluid. The  visualized abdominal aorta is ectatic,  but of normal caliber. There  is no significant retroperitoneal, peripelvic or mesenteric adenopathy. There is some nonspecific bilateral inguinal adenopathy. The visualized osseous structures show multilevel degenerative changes  in the visualized lumbar spine. IMPRESSION      1.  HEPATIC STEATOSIS. 2.  THERE ARE SCATTERED PANCREATIC CALCIFICATIONS.  MAY JUST BE DUE TO  THE POSSIBILITY OF CHRONIC PANCREATITIS. CORRELATE WITH PATIENT  HISTORY. 3.  OTHER FINDINGS AS DETAILED ABOVE. 4.  THE REMAINDER OF THE CT SCAN OF THE ABDOMEN AND PELVIS IS  OTHERWISE, UNREMARKABLE. Shu Welch By- Felicita Rodgers M.D. Released By- Felicita Rodgers M.D. Released Date Time- 12/18/12 7501   This document has been electronically signed. ------------------------------------------------------------------------------   ]    Assessment/Plan:     1. Asthma-COPD overlap syndrome ([facility]  He is using bronchodilator with duoneb QID and budesonide 2 times a day. C/o shortness of breath  with exertion. No Wheezing. No Cough or Sputum. continue bronchodilator therapy as before. CXR done in past show COPD. Continue bronchodilator therapy as before. He does not undergo for PFT. 2. Tobacco abuse  He is advised try to quit smoking. Risks related to smoking explained to the patient. Different ways to help to quit smoking were discussed today. He is smoking 10 cigarette a day. 3. Obesity (BMI 30-39. 9)  He is advised try to lose weight.  obesity related risk explained to the patient , Suggested weight control approaches, including dietary changes , exercise, behavioral modification. Patient notified that this is a billable service and has given verbal consent for telehealth services. Time spent with patient  22 minutes. Return in about 4 months (around 8/12/2021) for asthma, COPD.       Grant Mayers MD

## 2021-04-14 ENCOUNTER — TELEPHONE (OUTPATIENT)
Dept: FAMILY MEDICINE CLINIC | Age: 76
End: 2021-04-14

## 2021-05-03 RX ORDER — LISINOPRIL 5 MG/1
TABLET ORAL
Qty: 30 TABLET | Refills: 11 | Status: SHIPPED | OUTPATIENT
Start: 2021-05-03 | End: 2021-12-15 | Stop reason: SDUPTHER

## 2021-05-18 ENCOUNTER — TELEPHONE (OUTPATIENT)
Dept: FAMILY MEDICINE CLINIC | Age: 76
End: 2021-05-18

## 2021-06-21 ENCOUNTER — TELEPHONE (OUTPATIENT)
Dept: FAMILY MEDICINE CLINIC | Age: 76
End: 2021-06-21

## 2021-06-21 NOTE — TELEPHONE ENCOUNTER
Patient's daughter is calling to resquest a new nebulizer machine for the patient because the one he had broke. The new nebulizer machine needs to be sent to:    [address]:  [phone] #  [phone] #    Please advise and thank you!

## 2021-07-21 ENCOUNTER — TELEPHONE (OUTPATIENT)
Dept: FAMILY MEDICINE CLINIC | Age: 76
End: 2021-07-21

## 2021-08-19 ENCOUNTER — TELEPHONE (OUTPATIENT)
Dept: UROLOGY | Age: 76
End: 2021-08-19

## 2021-08-19 RX ORDER — AMOXICILLIN AND CLAVULANATE POTASSIUM 875; 125 MG/1; MG/1
1 TABLET, FILM COATED ORAL 2 TIMES DAILY
Qty: 28 TABLET | Refills: 1 | Status: SHIPPED | OUTPATIENT
Start: 2021-08-19 | End: 2021-09-02

## 2021-08-19 NOTE — TELEPHONE ENCOUNTER
Incoming Call    Caller:  Herb Becker    Communication (HIPPA):  HIPPA not applicable    Notes:  Patient is calling in to the office requesting an antibiotic if Dr. Jude Garcia would be willing to send one. Patient states that he has seen the  In the past and has received treatment for tailbone cysts. Please advise if willing to send.

## 2021-08-20 NOTE — TELEPHONE ENCOUNTER
Enriqueta Orlando saw him 6 years ago for a pilonidal cyst.  He needs to come back and see me. I will send in an antibiotic but make sure he agrees to see me as soon as possible to evaluate the situation.

## 2021-08-30 RX ORDER — INSULIN GLARGINE 100 [IU]/ML
INJECTION, SOLUTION SUBCUTANEOUS
Qty: 30 ML | Refills: 3 | Status: SHIPPED | OUTPATIENT
Start: 2021-08-30 | End: 2022-03-15 | Stop reason: SDUPTHER

## 2021-12-15 RX ORDER — GLIMEPIRIDE 2 MG/1
TABLET ORAL
Qty: 180 TABLET | Refills: 3 | Status: SHIPPED | OUTPATIENT
Start: 2021-12-15 | End: 2022-03-15 | Stop reason: SDUPTHER

## 2021-12-15 RX ORDER — LISINOPRIL 5 MG/1
TABLET ORAL
Qty: 30 TABLET | Refills: 11 | Status: SHIPPED | OUTPATIENT
Start: 2021-12-15

## 2021-12-15 NOTE — TELEPHONE ENCOUNTER
Patient  requesting medication refill. Please approve or deny this request.    Rx requested:  Requested Prescriptions     Pending Prescriptions Disp Refills    glimepiride (AMARYL) 2 MG tablet 180 tablet 3     Sig: bid    lisinopril (PRINIVIL;ZESTRIL) 5 MG tablet 30 tablet 11     Sig: TAKE 1 TABLET BY MOUTH EVERY DAY    SITagliptin (JANUVIA) 50 MG tablet 30 tablet 1     Sig: TAKE 1 TABLET BY MOUTH DAILY         Last Office Visit:   1/15/2021      Next Visit Date:  No future appointments.

## 2022-01-31 ENCOUNTER — TELEPHONE (OUTPATIENT)
Dept: FAMILY MEDICINE CLINIC | Age: 77
End: 2022-01-31

## 2022-02-08 RX ORDER — PEN NEEDLE, DIABETIC 32GX 5/32"
NEEDLE, DISPOSABLE MISCELLANEOUS
Qty: 100 EACH | Refills: 5 | Status: SHIPPED | OUTPATIENT
Start: 2022-02-08

## 2022-02-08 NOTE — TELEPHONE ENCOUNTER
Pharmacy requesting medication refill. Please approve or deny this request.    Rx requested:  Requested Prescriptions     Pending Prescriptions Disp Refills    Insulin Pen Needle ([facility] UNIFINE PENTIPS PLUS) 32G X 4 MM MISC 100 each 5     Sig: Use daily as directed. E11.65         Last Office Visit:   1/15/2021      Next Visit Date:  No future appointments.

## 2022-02-22 ENCOUNTER — TELEPHONE (OUTPATIENT)
Dept: FAMILY MEDICINE CLINIC | Age: 77
End: 2022-02-22

## 2022-03-14 RX ORDER — INSULIN GLARGINE 100 [IU]/ML
INJECTION, SOLUTION SUBCUTANEOUS
Qty: 30 ML | Refills: 3 | OUTPATIENT
Start: 2022-03-14

## 2022-03-15 ENCOUNTER — TELEMEDICINE (OUTPATIENT)
Dept: ENDOCRINOLOGY | Age: 77
End: 2022-03-15
Payer: MEDICARE

## 2022-03-15 PROCEDURE — G8428 CUR MEDS NOT DOCUMENT: HCPCS | Performed by: INTERNAL MEDICINE

## 2022-03-15 PROCEDURE — 1123F ACP DISCUSS/DSCN MKR DOCD: CPT | Performed by: INTERNAL MEDICINE

## 2022-03-15 PROCEDURE — 4040F PNEUMOC VAC/ADMIN/RCVD: CPT | Performed by: INTERNAL MEDICINE

## 2022-03-15 PROCEDURE — 3052F HG A1C>EQUAL 8.0%<EQUAL 9.0%: CPT | Performed by: INTERNAL MEDICINE

## 2022-03-15 PROCEDURE — 99213 OFFICE O/P EST LOW 20 MIN: CPT | Performed by: INTERNAL MEDICINE

## 2022-03-15 RX ORDER — GLIMEPIRIDE 2 MG/1
TABLET ORAL
Qty: 180 TABLET | Refills: 3 | Status: SHIPPED | OUTPATIENT
Start: 2022-03-15 | End: 2022-06-28 | Stop reason: SDUPTHER

## 2022-03-15 RX ORDER — INSULIN GLARGINE 100 [IU]/ML
INJECTION, SOLUTION SUBCUTANEOUS
Qty: 30 ML | Refills: 3 | Status: SHIPPED | OUTPATIENT
Start: 2022-03-15 | End: 2022-06-28 | Stop reason: SDUPTHER

## 2022-03-15 ASSESSMENT — ENCOUNTER SYMPTOMS: SHORTNESS OF BREATH: 1

## 2022-03-15 NOTE — PROGRESS NOTES
Triglycerides Latest Ref Range: 0 - 150 mg/dL  115   Albumin Latest Ref Range: 3.5 - 4.6 g/dL  3.8   Globulin Latest Ref Range: 2.3 - 3.5 g/dL  3.1   Alk Phos Latest Ref Range: 35 - 104 U/L  141 (H)   ALT Latest Ref Range: 0 - 41 U/L  17   AST Latest Ref Range: 0 - 40 U/L  20   Bilirubin Latest Ref Range: 0.2 - 0.7 mg/dL  1.1 (H)   Hemoglobin A1C Latest Ref Range: 4.8 - 5.9 % 10.4 (H) 8.5 (H)   TSH Latest Ref Range: 0.440 - 3.860 uIU/mL  2.080       Patient Active Problem List   Diagnosis    Diabetes mellitus, type 2 (HCC)    Coronary artery disease    Edema of extremities    Essential hypertension, benign    Osteoarthritis of knee    Tobacco abuse    BPH (benign prostatic hyperplasia)    Hyperlipidemia    Obesity (BMI 30.0-34. 9)    Stasis dermatitis of both legs    CHF (congestive heart failure) (HCC)    Lymphoproliferative disorder (HCC)    Asthma-COPD overlap syndrome (HCC)    Laceration of right lower extremity    Nontraumatic blister of skin    Obesity (BMI 30-39. 9)    Morbidly obese ([facility] [address]         Review of Systems   Respiratory: Positive for shortness of breath. All other systems reviewed and are negative. Prior to Visit Medications    Medication Sig Taking? Authorizing Provider   Insulin Pen Needle ([facility] UNIFINE PENTIPS PLUS) 32G X 4 MM MISC Use daily as directed.   E11.65  Lord Carlos MD   glimepiride (AMARYL) 2 MG tablet bid  Lord Carlos MD   lisinopril (PRINIVIL;ZESTRIL) 5 MG tablet TAKE 1 TABLET BY MOUTH EVERY DAY  Abdi Babin MD   SITagliptin (JANUVIA) 50 MG tablet TAKE 1 TABLET BY MOUTH DAILY  Abdi Babin MD   insulin glargine (LANTUS SOLOSTAR) 100 UNIT/ML injection pen inject 60 units SUBCUTANEOUSLY AT BEDTIME  Lord Carlos MD   Nebulizers ([address] [place] [address] NEBULIZER) MISC Nebulizer with albuterol QID  Koby Sousa MD   SITagliptin (JANUVIA) 100 MG tablet One daily  Lord Carlos MD   budesonide (PULMICORT) 0.5 MG/2ML nebulizer suspension Take 2 mLs by nebulization 2 times daily Michelle Martinez MD   ipratropium-albuterol (DUONEB) 0.5-2.5 (3) MG/3ML SOLN nebulizer solution Inhale 3 mLs into the lungs every 6 hours As directed by Dr Merry Rogers. Jonatan Christie MD   ammonium lactate (LAC-HYDRIN) 12 % lotion Apply topically daily Apply topically as needed. Historical Provider, MD   furosemide (LASIX) 40 MG tablet Take 40 mg by mouth See Admin Instructions 1 tab 2-3 times weekly, may increase to 1 tab daily   [facility] Provider, MD   Magnesium 500 MG TABS Take  by mouth. Historical Provider, MD   aspirin 325 MG tablet Take 325 mg by mouth daily. Historical Provider, MD   hydrochlorothiazide (HYDRODIURIL) 25 MG tablet Take 25 mg by mouth daily. [facility]  Historical Provider, MD   metoprolol (LOPRESSOR) 100 MG tablet Take 50 mg by mouth 2 times daily. 1/2 tab twice daily   Historical Provider, MD   simvastatin (ZOCOR) 80 MG tablet Take 80 mg by mouth nightly. [facility]  Historical Provider, MD       Social History     Tobacco Use    Smoking status: Current Every Day Smoker     Packs/day: 0.30     Years: 46.00     Pack years: 13.80     Types: Cigarettes    Smokeless tobacco: Never Used    Tobacco comment: pt aware of danger   Substance Use Topics    Alcohol use: No     Comment: drinks an occasional beer every 3 months.  Drug use:  No            PHYSICAL EXAMINATION:  [ INSTRUCTIONS:  \"[x]\" Indicates a positive item  \"[]\" Indicates a negative item  -- DELETE ALL ITEMS NOT EXAMINED]  [] Alert  [] Oriented to person/place/time    [] No apparent distress  [] Toxic appearing    [] Face flushed appearing [] Sclera clear  [] Lips are cyanotic      [] Breathing appears normal  [] Appears tachypneic      [] Rash on visible skin    [] Cranial Nerves II-XII grossly intact    [] Motor grossly intact in visible upper extremities    [] Motor grossly intact in visible lower extremities    [] Normal Mood  [] Anxious appearing    [] Depressed appearing  [] Confused appearing      [] through a video synchronous discussion virtually to substitute for in-person clinic visit.

## 2022-05-13 ENCOUNTER — TELEPHONE (OUTPATIENT)
Dept: FAMILY MEDICINE CLINIC | Age: 77
End: 2022-05-13

## 2022-06-20 LAB
ANION GAP SERPL CALCULATED.3IONS-SCNC: 14 MEQ/L (ref 9–15)
BUN BLDV-MCNC: 39 MG/DL (ref 8–23)
CALCIUM SERPL-MCNC: 9.3 MG/DL (ref 8.5–9.9)
CHLORIDE BLD-SCNC: 94 MEQ/L (ref 95–107)
CO2: 29 MEQ/L (ref 20–31)
CREAT SERPL-MCNC: 1.23 MG/DL (ref 0.7–1.2)
GFR AFRICAN AMERICAN: >60
GFR NON-AFRICAN AMERICAN: 56.7
GLUCOSE BLD-MCNC: 292 MG/DL (ref 70–99)
HBA1C MFR BLD: 9.4 % (ref 4.8–5.9)
POTASSIUM SERPL-SCNC: 4.7 MEQ/L (ref 3.4–4.9)
SODIUM BLD-SCNC: 137 MEQ/L (ref 135–144)

## 2022-06-28 ENCOUNTER — TELEMEDICINE (OUTPATIENT)
Dept: ENDOCRINOLOGY | Age: 77
End: 2022-06-28
Payer: MEDICARE

## 2022-06-28 PROCEDURE — 99442 PR PHYS/QHP TELEPHONE EVALUATION 11-20 MIN: CPT | Performed by: INTERNAL MEDICINE

## 2022-06-28 RX ORDER — INSULIN GLARGINE 100 [IU]/ML
INJECTION, SOLUTION SUBCUTANEOUS
Qty: 30 ML | Refills: 3 | Status: SHIPPED | OUTPATIENT
Start: 2022-06-28

## 2022-06-28 RX ORDER — GLIMEPIRIDE 2 MG/1
TABLET ORAL
Qty: 180 TABLET | Refills: 3 | Status: SHIPPED | OUTPATIENT
Start: 2022-06-28

## 2022-06-28 NOTE — PROGRESS NOTES
2022    TELEHEALTH EVALUATION -- Audio/Visual (During QAUEG-38 public health emergency)    Due to [place] outbreak, patient's office visit was converted to a virtual visit. Patient was contacted and agreed to proceed with a virtual visit via Telephone Visit  The risks and benefits of converting to a virtual visit were discussed in light of the current infectious disease epidemic. Patient also understood that insurance coverage and co-pays are up to their individual insurance plans. HPI: Telephone visit patient was at home I was at her office follow-up on type 2 diabetes with complications of chronic kidney disease patient's hemoglobin A1c is gone up to 9.4 overall blood sugars close to 200 denies any hypoglycemia patient unable to do much activity because of his pulmonary status  Currently on Lantus 60 units at bedtime Januvia 50 mg daily and glimepiride 2 mg twice daily testing periodically    Verna Ugalde (:  1945) has requested an audio/video evaluation for the following concern(s):    Results for Doris Jasso (MRN [id number]) as of 2022 16:53   Ref.  Range 2020 11:23 2022 10:34 2022 09:09   Sodium Latest Ref Range: 135 - 144 mEq/L 135 136 137   Potassium Latest Ref Range: 3.4 - 4.9 mEq/L 3.8 3.9 4.7   Chloride Latest Ref Range: 95 - 107 mEq/L 97 94 (L) 94 (L)   CO2 Latest Ref Range: 20 - 31 mEq/L 26 33 (H) 29   BUN,BUNPL Latest Ref Range: 8 - 23 mg/dL 27 (H) 33 (H) 39 (H)   Creatinine Latest Ref Range: 0.70 - 1.20 mg/dL 1.01 0.96 1.23 (H)   Anion Gap Latest Ref Range: 9 - 15 mEq/L 12 9 14   GFR Non- Latest Ref Range: >60  >60.0 >60.0 56.7 (L)   GFR  Latest Ref Range: >60  >60.0 >60.0 >60.0   Magnesium Latest Ref Range: 1.7 - 2.4 mg/dL  2.2    GLUCOSE, FASTING,GF Latest Ref Range: 70 - 99 mg/dL 174 (H) 248 (H) 292 (H)   CALCIUM, SERUM, [id number] Latest Ref Range: 8.5 - 9.9 mg/dL 9.5 9.1 9.3   Total Protein Latest Ref Range: 6.3 - 8.0 g/dL  6.9    CHOLESTEROL, TOTAL, [id number] Latest Ref Range: 0 - 199 mg/dL  121    HDL Cholesterol Latest Ref Range: 40 - 59 mg/dL  35 (L)    LDL Calculated Latest Ref Range: 0 - 129 mg/dL  63    Triglycerides Latest Ref Range: 0 - 150 mg/dL  115    Albumin Latest Ref Range: 3.5 - 4.6 g/dL  3.8    Globulin Latest Ref Range: 2.3 - 3.5 g/dL  3.1    Alk Phos Latest Ref Range: 35 - 104 U/L  141 (H)    ALT Latest Ref Range: 0 - 41 U/L  17    AST Latest Ref Range: 0 - 40 U/L  20    Bilirubin Latest Ref Range: 0.2 - 0.7 mg/dL  1.1 (H)    Hemoglobin A1C Latest Ref Range: 4.8 - 5.9 % 10.4 (H) 8.5 (H) 9.4 (H)   TSH Latest Ref Range: 0.440 - 3.860 uIU/mL  2.080        Review of Systems   Constitutional: Positive for fatigue. Respiratory: Positive for shortness of breath. Endocrine: Negative. All other systems reviewed and are negative. Prior to Visit Medications    Medication Sig Taking? Authorizing Provider   insulin glargine (LANTUS SOLOSTAR) 100 UNIT/ML injection pen inject 60 units SUBCUTANEOUSLY AT BEDTIME  Lara Cohen MD   SITagliptin (JANUVIA) 50 MG tablet TAKE 1 TABLET BY MOUTH DAILY  Abdi Babin MD   glimepiride (AMARYL) 2 MG tablet bid  Abdi Babin MD   Insulin Pen Needle ([facility] [place] [facility]) 32G X 4 MM MISC Use daily as directed. E11.65  Lara Cohen MD   lisinopril (PRINIVIL;ZESTRIL) 5 MG tablet TAKE 1 TABLET BY MOUTH EVERY DAY  Abdi Rowell MD   Nebulizers ([facility] NEBULIZER) MISC Nebulizer with albuterol QID  Barbara Ho MD   SITagliptin (JANUVIA) 100 MG tablet One daily  Abdi Babin MD   budesonide (PULMICORT) 0.5 MG/2ML nebulizer suspension Take 2 mLs by nebulization 2 times daily Brannon Aceves MD   ipratropium-albuterol (DUONEB) 0.5-2.5 (3) MG/3ML SOLN nebulizer solution Inhale 3 mLs into the lungs every 6 hours As directed by Dr Janeen Sykes. Barbara Ho MD   ammonium lactate (LAC-HYDRIN) 12 % lotion Apply topically daily Apply topically as needed.   Historical Provider, MD   furosemide (LASIX) 40 MG tablet Take 40 mg by mouth See Admin Instructions 1 tab 2-3 times weekly, may increase to 1 tab daily   [facility] Provider, MD   Magnesium 500 MG TABS Take  by mouth. Historical Provider, MD   aspirin 325 MG tablet Take 325 mg by mouth daily. Historical Provider, MD   hydrochlorothiazide (HYDRODIURIL) 25 MG tablet Take 25 mg by mouth daily. [facility]  Historical Provider, MD   metoprolol (LOPRESSOR) 100 MG tablet Take 50 mg by mouth 2 times daily. 1/2 tab twice daily   Historical Provider, MD   simvastatin (ZOCOR) 80 MG tablet Take 80 mg by mouth nightly. [facility]  Historical Provider, MD       Social History     Tobacco Use    Smoking status: Current Every Day Smoker     Packs/day: 0.30     Years: 46.00     Pack years: 13.80     Types: Cigarettes    Smokeless tobacco: Never Used    Tobacco comment: pt aware of danger   Substance Use Topics    Alcohol use: No     Comment: drinks an occasional beer every 3 months.  Drug use: No            PHYSICAL EXAMINATION:  [ INSTRUCTIONS:  \"[x]\" Indicates a positive item  \"[]\" Indicates a negative item  -- DELETE ALL ITEMS NOT EXAMINED]  [] Alert  [] Oriented to person/place/time    [] No apparent distress  [] Toxic appearing    [] Face flushed appearing [] Sclera clear  [] Lips are cyanotic      [] Breathing appears normal  [] Appears tachypneic      [] Rash on visible skin    [] Cranial Nerves II-XII grossly intact    [] Motor grossly intact in visible upper extremities    [] Motor grossly intact in visible lower extremities    [] Normal Mood  [] Anxious appearing    [] Depressed appearing  [] Confused appearing      [] Poor short term memory  [] Poor long term memory    [] OTHER:      Due to this being a TeleHealth encounter, evaluation of the following organ systems is limited: Vitals/Constitutional/EENT/Resp/CV/GI//MS/Neuro/Skin/Heme-Lymph-Imm. ASSESSMENT/PLAN:     Diagnosis Orders   1.  Uncontrolled type 2 diabetes mellitus with complication, with long-term current use of insulin (HCC)  glimepiride (AMARYL) 2 MG tablet    SITagliptin (JANUVIA) 50 MG tablet    DISCONTINUED: SITagliptin (JANUVIA) 50 MG tablet     Continue current medication regimen  Orders Placed This Encounter   Medications    DISCONTD: SITagliptin (JANUVIA) 50 MG tablet     Sig: TAKE 1 TABLET BY MOUTH DAILY     Dispense:  30 tablet     Refill:  1    glimepiride (AMARYL) 2 MG tablet     Sig: bid     Dispense:  180 tablet     Refill:  3     Patient has been taking this differently than prescribed. If this was approved, please send a new prescription that reflects new directions. Thank you!  insulin glargine (LANTUS SOLOSTAR) 100 UNIT/ML injection pen     Sig: inject 60 units SUBCUTANEOUSLY AT BEDTIME     Dispense:  30 mL     Refill:  3    SITagliptin (JANUVIA) 50 MG tablet     Sig: TAKE 1 TABLET BY MOUTH DAILY     Dispense:  30 tablet     Refill:  5     Follow-up in 3 to 6 months time  A1c goal of 9 or lower  Total time spent 15 minutes    An  electronic signature was used to authenticate this note. --Onel Thompson MD on 6/28/2022 at 4:53 PM        Pursuant to the emergency declaration under the [facility] waiver authority and the [employer] Act, this Virtual  Visit was conducted, with patient's consent, to reduce the patient's risk of exposure to COVID-19 and provide continuity of care for an established patient. Services were provided through a video synchronous discussion virtually to substitute for in-person clinic visit.

## 2022-06-29 ASSESSMENT — ENCOUNTER SYMPTOMS: SHORTNESS OF BREATH: 1

## 2022-07-19 ENCOUNTER — TELEMEDICINE (OUTPATIENT)
Dept: PULMONOLOGY | Age: 77
End: 2022-07-19
Payer: MEDICARE

## 2022-07-19 DIAGNOSIS — E66.9 OBESITY (BMI 30-39.9): ICD-10-CM

## 2022-07-19 DIAGNOSIS — Z72.0 TOBACCO ABUSE: Primary | ICD-10-CM

## 2022-07-19 DIAGNOSIS — J44.9 ASTHMA-COPD OVERLAP SYNDROME (HCC): ICD-10-CM

## 2022-07-19 PROCEDURE — 99443 PR PHYS/QHP TELEPHONE EVALUATION 21-30 MIN: CPT | Performed by: INTERNAL MEDICINE

## 2022-07-19 RX ORDER — HYDROCHLOROTHIAZIDE 25 MG/1
25 TABLET ORAL
COMMUNITY
End: 2022-07-19 | Stop reason: SDUPTHER

## 2022-07-19 RX ORDER — METOPROLOL TARTRATE 100 MG/1
50 TABLET ORAL
COMMUNITY
End: 2022-07-19 | Stop reason: SDUPTHER

## 2022-07-19 RX ORDER — SIMVASTATIN 80 MG
80 TABLET ORAL
COMMUNITY
End: 2022-07-19 | Stop reason: SDUPTHER

## 2022-07-19 NOTE — PROGRESS NOTES
Subjective:     Felix Parnell is a 76 y.o. male who complains today of:     Chief Complaint   Patient presents with    Asthma     Needs a new nebulizer machine, wants script sent to [facility]     Patient and/or health care decision maker is aware that that he/she may receive a bill for this telephone service, depending on his insurance coverage, and has provided verbal consent to proceed. HPI  He is using bronchodilator with duoneb QID and budesonide 2 times a day. New nebulizer machine. C/o shortness of breath  with exertion. No Wheezing   No Cough or Sputum  No Hemoptysis  No Chest tightness  No Chest pain with radiation  or pleuritic pain  No Fever or chills. No Rhinorrhea and postnasal drip. He is smoking 2-3 cigarette a day. Allergies:  Patient has no known allergies. Past Medical History:   Diagnosis Date    BPH (benign prostatic hyperplasia)     Dr. Ebony Salmeron    CAD (coronary artery disease)     status post CABG,    CHF (congestive heart failure) ([facility])     Chronic venous insufficiency     COPD (chronic obstructive pulmonary disease) ([facility])     Edema of extremities 9/30/2013    Elevated PSA     Enlarged prostate     Hyperlipidemia     Hypertension     Hypoxemia     uses supplemental oxygen at night. Lymphoproliferative disorder ([facility] [address] 2013    had previous full work up. Dr. Jovanna Patel. High WBC and low PLT    Macular degeneration, right eye     Obesity (BMI 30.0-34.9)     Osteoarthritis of both knees     Pneumonia     Stasis dermatitis     uses lachyrdrin    Tobacco abuse     Type II diabetes mellitus, uncontrolled ([address]     Varicose veins     status post stripping procedure.       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 Also worked for [employer]. Tobacco Use    Smoking status: Every Day     Packs/day: 0.30     Years: 46.00     Pack years: 13.80     Types: Cigarettes    Smokeless tobacco: Never    Tobacco comments:     pt aware of danger   Substance and Sexual Activity    Alcohol use: No     Comment: drinks an occasional beer every 3 months. Drug use: No    Sexual activity: Yes     Partners: Female   Other Topics Concern    Not on file   Social History Narrative    Not on file     Social Determinants of Health     Financial Resource Strain: Not on file   Food Insecurity: Not on file   Transportation Needs: Not on file   Physical Activity: Not on file   Stress: Not on file   Social Connections: Not on file   Intimate Partner Violence: Not on file   Housing Stability: Not on file         Review of Systems   Constitutional:  Negative for chills, diaphoresis, fatigue and fever. HENT:  Negative for congestion, mouth sores, nosebleeds, postnasal drip, rhinorrhea, sneezing, sore throat and voice change. Eyes:  Negative for itching and visual disturbance. Respiratory:  Positive for shortness of breath. Negative for cough, chest tightness and wheezing. Cardiovascular: Negative. Negative for chest pain, palpitations and leg swelling. Gastrointestinal:  Negative for abdominal pain, diarrhea, nausea and vomiting. Genitourinary:  Negative for difficulty urinating and hematuria. Musculoskeletal:  Negative for arthralgias, joint swelling and myalgias. Skin:  Negative for rash. Allergic/Immunologic: Negative for environmental allergies.    Neurological:  Negative for dizziness, tremors, weakness and headaches. Psychiatric/Behavioral:  Negative for behavioral problems and sleep disturbance.        :   There were no vitals filed for this visit. Wt Readings from Last 3 Encounters:   04/06/20 245 lb (111.1 kg)   12/11/19 220 lb 12.8 oz (100.2 kg)   11/04/19 224 lb (101.6 kg)         Physical Exam phone visit     Current Outpatient Medications   Medication Sig Dispense Refill    Nebulizers (COMPRESSOR/NEBULIZER) MISC 1 Units by Does not apply route 4 times daily 1 each 0    glimepiride (AMARYL) 2 MG tablet bid 180 tablet 3    insulin glargine (LANTUS SOLOSTAR) 100 UNIT/ML injection pen inject 60 units SUBCUTANEOUSLY AT BEDTIME 30 mL 3    Insulin Pen Needle ([employer] [facility] UNIFINE PENTIPS PLUS) 32G X 4 MM MISC Use daily as directed. E11.65 100 each 5    lisinopril (PRINIVIL;ZESTRIL) 5 MG tablet TAKE 1 TABLET BY MOUTH EVERY DAY 30 tablet 11    Nebulizers (AIRIAL COMPRESS PED NEBULIZER) MISC Nebulizer with albuterol QID 1 each 0    SITagliptin (JANUVIA) 100 MG tablet One daily 30 tablet 3    budesonide (PULMICORT) 0.5 MG/2ML nebulizer suspension Take 2 mLs by nebulization 2 times daily Panch 60 ampule 11    ipratropium-albuterol (DUONEB) 0.5-2.5 (3) MG/3ML SOLN nebulizer solution Inhale 3 mLs into the lungs every 6 hours As directed by Dr Albaro Pinto. 120 mL 11    furosemide (LASIX) 40 MG tablet Take 40 mg by mouth See Admin Instructions 1 tab 2-3 times weekly, may increase to 1 tab daily   Calixto      Magnesium 500 MG TABS Take  by mouth. aspirin 325 MG tablet Take 325 mg by mouth daily. hydrochlorothiazide (HYDRODIURIL) 25 MG tablet Take 25 mg by mouth daily. [address] [facility]      metoprolol (LOPRESSOR) 100 MG tablet Take 50 mg by mouth 2 times daily. 1/2 tab twice daily       simvastatin (ZOCOR) 80 MG tablet Take 80 mg by mouth nightly. [address] [facility]      ammonium lactate (LAC-HYDRIN) 12 % lotion Apply topically daily Apply topically as needed. No current facility-administered medications for this visit.        Results for orders placed during the hospital encounter of 06/15/20    XR CHEST STANDARD (2 VW)    Narrative  XR CHEST (2 VW): 6/15/2020    CLINICAL HISTORY: J44.9 Asthma-COPD overlap syndrome ([facility] [address] ICD10. COMPARISON: 10/11/2018. TECHNIQUE: Upright PA and lateral radiographs of the chest were obtained. FINDINGS:    Hyperinflation and coarsening of the bronchovascular structures consistent with COPD appears unchanged. The heart remains borderline enlarged, with postoperative changes from previous CABG again noted. There are no developing infiltrates, pleural effusion, vascular congestion, pneumothorax, or displaced fractures identified. Impression  NO EVIDENCE OF ACTIVE CARDIOPULMONARY DISEASE. COPD, WHICH IS BEST EVALUATED CLINICALLY. Results for orders placed during the hospital encounter of 10/11/18    XR CHEST STANDARD (2 VW)    Narrative  EXAMINATION: CHEST TWO VIEWS    CLINICAL HISTORY: J44.9 Asthma-COPD overlap syndrome ([facility] [address] ICD10    COMPARISONS: November 1, 2017    FINDINGS:    Two views of the chest are submitted. There are multiple median sternotomy wires. The cardiac silhouette is borderline enlarged. Unchanged The mediastinum is unremarkable. Pulmonary vascular is attenuated, lungs are hyperinflated and there is some widening of the AP diameter the chest and coarsening of the interstitium. Right sided trachea. Areas of atelectasis left lower lobe  No focal infiltrates. No effusions. Pneumothoraces. Impression  NO ACUTE ACTIVE CARDIOPULMONARY PROCESS. RADIOGRAPHIC FINDINGS  OF COPD. Results for orders placed during the hospital encounter of 11/01/17    XR CHEST STANDARD (2 VW)    Narrative  EXAMINATION: XR CHEST STANDARD TWO VW    CLINICAL HISTORY: 74year-old with respiratory symptoms and positive smoking history. COMPARISONS: Chest x-ray 7/31/2012    FINDINGS: Frontal and lateral views the chest were obtained. There are sternotomy wires.  Again demonstrated are mediastinal clips. Cardiac silhouette is within normal limits in size. There is slight unfolding of thoracic aorta which does contain  atherosclerotic calcifications. The mediastinal contours are stable. Coarsening of interstitial markings again demonstrated most pronounced in the mid and lower lung zones. No airspace consolidations or worrisome nodules. The no plain film signs of  pleural effusion or pneumothorax. Degenerative changes with endplate spurring again demonstrated in the thoracic spine. Impression  COARSE CHRONIC INTERSTITIAL MARKINGS MOST PRONOUNCED IN THE MID AND LOWER LUNG ZONES. NO RADIOGRAPHIC FINDINGS OF ACUTE CARDIOPULMONARY DISEASE.  ]  No results found for this or any previous visit.  ]  No results found for this or any previous visit.  ]  No results found for this or any previous visit.  ]  Results for orders placed during the hospital encounter of 12/18/12    CT Chest W Contrast    Narrative  EXAMINATION  CT SCAN OF THE CHEST, WITH CONTRAST    CLINICAL DATA  LYMPHADENOPATHY. COMPARISON  None. TECHNIQUE  Multiple serial axial images from the base of the neck  through the upper abdomen with coronal reconstructions, following the  intravenous administration of 100 cc of Optiray 320 was performed. FINDINGS     There are no focal parenchymal abnormalities. No pleural  effusions. No pneumothoraces. There is no significant para-aortic,  parahilar or subcarinal adenopathy   There are some scattered  pretracheal adenopathy noted. I see no significant axillary  adenopathy. Within the field-of-view there is some partially  visualized,  subcentimeter supraclavicular nodes noted. Visualized osseous structures show multilevel degenerative change. IMPRESSION    1.  THERE IS A FEW PRETRACHEAL LYMPH NODES WITHIN THE MEDIASTINUM. 2.   THE REMAINDER OF THE CT SCAN OF THE CHEST IS OTHERWISE,  UNREMARKABLE.       EXAMINATION  CT SCAN OF THE ABDOMEN AND PELVIS, WITH CONTRAST    CLINICAL DATA  SEE ABOVE. TECHNIQUE  Multiple serial axial images from the base of the lungs  through the pelvis with coronal reconstructions following the oral and  intravenous administration of contrast, as described above was  performed. FINDINGS   There is diffuse decrease in attenuation of the liver,  which is a nonspecific finding, which can be seen in fatty  infiltration. No other focal parenchyma abnormalities. No  intrahepatic biliary dilatation. The gallbladder, pancreas, and spleen  are unremarkable. There is some scattered calcifications seen within  the pancreas, which may be due to possibility of chronic pancreatitis. Correlate clinically. The adrenals and kidneys are unremarkable. Large and small bowel show no sign of obstruction. The appendix is  visualized. It is retrocecal and is partially air-filled. It measures  5 to 6 mm. No diverticulitis. No free air. No free fluid. The  visualized abdominal aorta is ectatic,  but of normal caliber. There  is no significant retroperitoneal, peripelvic or mesenteric adenopathy. There is some nonspecific bilateral inguinal adenopathy. The visualized osseous structures show multilevel degenerative changes  in the visualized lumbar spine. IMPRESSION    1.  HEPATIC STEATOSIS. 2.  THERE ARE SCATTERED PANCREATIC CALCIFICATIONS.  MAY JUST BE DUE TO  THE POSSIBILITY OF CHRONIC PANCREATITIS. CORRELATE WITH PATIENT  HISTORY. 3.  OTHER FINDINGS AS DETAILED ABOVE. 4.  THE REMAINDER OF THE CT SCAN OF THE ABDOMEN AND PELVIS IS  OTHERWISE, UNREMARKABLE. Mata Javier By- Yadira Talamantes M.D. Released By- Yadira Talamantes M.D. Released Date Time- 12/18/12 0294  This document has been electronically signed. ------------------------------------------------------------------------------  ]    Assessment/Plan:     1.  Asthma-COPD overlap syndrome ([facility] [address]  He is using bronchodilator with duoneb QID and budesonide 2 times a

## 2022-07-24 ASSESSMENT — ENCOUNTER SYMPTOMS
RHINORRHEA: 0
SHORTNESS OF BREATH: 1
VOICE CHANGE: 0
DIARRHEA: 0
COUGH: 0
EYE ITCHING: 0
CHEST TIGHTNESS: 0
NAUSEA: 0
VOMITING: 0
WHEEZING: 0
ABDOMINAL PAIN: 0
SORE THROAT: 0

## 2022-09-22 NOTE — TELEPHONE ENCOUNTER
Patient requesting medication refill.  Please approve or deny this request.    Rx requested:  Requested Prescriptions     Pending Prescriptions Disp Refills    SITagliptin (JANUVIA) 100 MG tablet 30 tablet 3     Sig: One daily         Last Office Visit:   6/28/2022      Next Visit Date:  Future Appointments   Date Time Provider Dania Shannon   11/21/2022 11:30 AM Willis Murillo MD [facility]

## 2022-10-23 DIAGNOSIS — E11.8 TYPE 2 DIABETES MELLITUS WITH UNSPECIFIED COMPLICATIONS (HCC): ICD-10-CM

## 2023-01-20 ENCOUNTER — TELEPHONE (OUTPATIENT)
Dept: FAMILY MEDICINE CLINIC | Age: 78
End: 2023-01-20

## 2023-03-15 ENCOUNTER — TELEPHONE (OUTPATIENT)
Dept: FAMILY MEDICINE CLINIC | Age: 78
End: 2023-03-15